# Patient Record
Sex: MALE | Race: BLACK OR AFRICAN AMERICAN | Employment: UNEMPLOYED | URBAN - METROPOLITAN AREA
[De-identification: names, ages, dates, MRNs, and addresses within clinical notes are randomized per-mention and may not be internally consistent; named-entity substitution may affect disease eponyms.]

---

## 2020-01-15 ENCOUNTER — HOSPITAL ENCOUNTER (EMERGENCY)
Age: 50
Discharge: HOME | DRG: 139 | End: 2020-01-15
Attending: EMERGENCY MEDICINE
Payer: MEDICAID

## 2020-01-15 ENCOUNTER — APPOINTMENT (OUTPATIENT)
Dept: GENERAL RADIOLOGY | Age: 50
DRG: 139 | End: 2020-01-15
Payer: MEDICAID

## 2020-01-15 ENCOUNTER — HOSPITAL ENCOUNTER (INPATIENT)
Age: 50
LOS: 1 days | Discharge: HOME | DRG: 139 | End: 2020-01-18
Attending: EMERGENCY MEDICINE | Admitting: FAMILY MEDICINE
Payer: MEDICAID

## 2020-01-15 VITALS
DIASTOLIC BLOOD PRESSURE: 87 MMHG | BODY MASS INDEX: 25.11 KG/M2 | WEIGHT: 175 LBS | RESPIRATION RATE: 16 BRPM | HEART RATE: 109 BPM | TEMPERATURE: 97.8 F | SYSTOLIC BLOOD PRESSURE: 120 MMHG | OXYGEN SATURATION: 97 %

## 2020-01-15 PROBLEM — J18.9 PNEUMONIA: Status: ACTIVE | Noted: 2020-01-15

## 2020-01-15 LAB
ANION GAP SERPL CALCULATED.3IONS-SCNC: 15 MMOL/L (ref 3–16)
BASE EXCESS VENOUS: -0.7 MMOL/L (ref -2–3)
BASOPHILS ABSOLUTE: 0 K/UL (ref 0–0.2)
BASOPHILS RELATIVE PERCENT: 0.2 %
BUN BLDV-MCNC: 10 MG/DL (ref 7–20)
CALCIUM SERPL-MCNC: 9.2 MG/DL (ref 8.3–10.6)
CARBOXYHEMOGLOBIN: 2.4 % (ref 0–1.5)
CHLORIDE BLD-SCNC: 99 MMOL/L (ref 99–110)
CO2: 22 MMOL/L (ref 21–32)
CREAT SERPL-MCNC: 0.7 MG/DL (ref 0.9–1.3)
EOSINOPHILS ABSOLUTE: 0 K/UL (ref 0–0.6)
EOSINOPHILS RELATIVE PERCENT: 0.2 %
GFR AFRICAN AMERICAN: >60
GFR NON-AFRICAN AMERICAN: >60
GLUCOSE BLD-MCNC: 129 MG/DL (ref 70–99)
HCO3 VENOUS: 22.2 MMOL/L (ref 24–28)
HCT VFR BLD CALC: 40.1 % (ref 40.5–52.5)
HEMOGLOBIN, VEN, REDUCED: 9.4 %
HEMOGLOBIN: 12.9 G/DL (ref 13.5–17.5)
LACTIC ACID, SEPSIS: 1.6 MMOL/L (ref 0.4–1.9)
LACTIC ACID, SEPSIS: 2.4 MMOL/L (ref 0.4–1.9)
LYMPHOCYTES ABSOLUTE: 0.5 K/UL (ref 1–5.1)
LYMPHOCYTES RELATIVE PERCENT: 3.5 %
MCH RBC QN AUTO: 32.5 PG (ref 26–34)
MCHC RBC AUTO-ENTMCNC: 32.2 G/DL (ref 31–36)
MCV RBC AUTO: 101 FL (ref 80–100)
METHEMOGLOBIN VENOUS: 0.3 % (ref 0–1.5)
MONOCYTES ABSOLUTE: 0.4 K/UL (ref 0–1.3)
MONOCYTES RELATIVE PERCENT: 2.9 %
NEUTROPHILS ABSOLUTE: 12.1 K/UL (ref 1.7–7.7)
NEUTROPHILS RELATIVE PERCENT: 93.2 %
O2 SAT, VEN: 90 %
PCO2, VEN: 32.5 MMHG (ref 41–51)
PDW BLD-RTO: 13.6 % (ref 12.4–15.4)
PH VENOUS: 7.45 (ref 7.35–7.45)
PLATELET # BLD: 582 K/UL (ref 135–450)
PMV BLD AUTO: 7.3 FL (ref 5–10.5)
PO2, VEN: 59.7 MMHG (ref 25–40)
POTASSIUM REFLEX MAGNESIUM: 4 MMOL/L (ref 3.5–5.1)
RAPID INFLUENZA  B AGN: NEGATIVE
RAPID INFLUENZA A AGN: NEGATIVE
RBC # BLD: 3.97 M/UL (ref 4.2–5.9)
SODIUM BLD-SCNC: 136 MMOL/L (ref 136–145)
TCO2 CALC VENOUS: 23 MMOL/L
WBC # BLD: 13 K/UL (ref 4–11)

## 2020-01-15 PROCEDURE — 2580000003 HC RX 258: Performed by: INTERNAL MEDICINE

## 2020-01-15 PROCEDURE — 87804 INFLUENZA ASSAY W/OPTIC: CPT

## 2020-01-15 PROCEDURE — 99285 EMERGENCY DEPT VISIT HI MDM: CPT

## 2020-01-15 PROCEDURE — 36415 COLL VENOUS BLD VENIPUNCTURE: CPT

## 2020-01-15 PROCEDURE — 71046 X-RAY EXAM CHEST 2 VIEWS: CPT

## 2020-01-15 PROCEDURE — 85025 COMPLETE CBC W/AUTO DIFF WBC: CPT

## 2020-01-15 PROCEDURE — 6360000002 HC RX W HCPCS: Performed by: EMERGENCY MEDICINE

## 2020-01-15 PROCEDURE — 96365 THER/PROPH/DIAG IV INF INIT: CPT

## 2020-01-15 PROCEDURE — 83605 ASSAY OF LACTIC ACID: CPT

## 2020-01-15 PROCEDURE — 82803 BLOOD GASES ANY COMBINATION: CPT

## 2020-01-15 PROCEDURE — 94640 AIRWAY INHALATION TREATMENT: CPT

## 2020-01-15 PROCEDURE — G0378 HOSPITAL OBSERVATION PER HR: HCPCS

## 2020-01-15 PROCEDURE — 6370000000 HC RX 637 (ALT 250 FOR IP): Performed by: EMERGENCY MEDICINE

## 2020-01-15 PROCEDURE — 94761 N-INVAS EAR/PLS OXIMETRY MLT: CPT

## 2020-01-15 PROCEDURE — 87040 BLOOD CULTURE FOR BACTERIA: CPT

## 2020-01-15 PROCEDURE — 80048 BASIC METABOLIC PNL TOTAL CA: CPT

## 2020-01-15 PROCEDURE — 2580000003 HC RX 258: Performed by: EMERGENCY MEDICINE

## 2020-01-15 PROCEDURE — 96367 TX/PROPH/DG ADDL SEQ IV INF: CPT

## 2020-01-15 RX ORDER — ALBUTEROL SULFATE 2.5 MG/3ML
2.5 SOLUTION RESPIRATORY (INHALATION) EVERY 4 HOURS PRN
Status: DISCONTINUED | OUTPATIENT
Start: 2020-01-15 | End: 2020-01-15

## 2020-01-15 RX ORDER — PREDNISONE 20 MG/1
40 TABLET ORAL ONCE
Status: COMPLETED | OUTPATIENT
Start: 2020-01-15 | End: 2020-01-15

## 2020-01-15 RX ORDER — PANTOPRAZOLE SODIUM 40 MG/1
40 TABLET, DELAYED RELEASE ORAL
Status: DISCONTINUED | OUTPATIENT
Start: 2020-01-16 | End: 2020-01-18 | Stop reason: HOSPADM

## 2020-01-15 RX ORDER — ALBUTEROL SULFATE 2.5 MG/3ML
2.5 SOLUTION RESPIRATORY (INHALATION) ONCE
Status: COMPLETED | OUTPATIENT
Start: 2020-01-15 | End: 2020-01-15

## 2020-01-15 RX ORDER — 0.9 % SODIUM CHLORIDE 0.9 %
30 INTRAVENOUS SOLUTION INTRAVENOUS ONCE
Status: DISCONTINUED | OUTPATIENT
Start: 2020-01-15 | End: 2020-01-15 | Stop reason: HOSPADM

## 2020-01-15 RX ORDER — SODIUM CHLORIDE 0.9 % (FLUSH) 0.9 %
10 SYRINGE (ML) INJECTION EVERY 12 HOURS SCHEDULED
Status: DISCONTINUED | OUTPATIENT
Start: 2020-01-15 | End: 2020-01-18 | Stop reason: HOSPADM

## 2020-01-15 RX ORDER — IPRATROPIUM BROMIDE AND ALBUTEROL SULFATE 2.5; .5 MG/3ML; MG/3ML
1 SOLUTION RESPIRATORY (INHALATION) ONCE
Status: COMPLETED | OUTPATIENT
Start: 2020-01-15 | End: 2020-01-15

## 2020-01-15 RX ORDER — GUAIFENESIN/DEXTROMETHORPHAN 100-10MG/5
5 SYRUP ORAL EVERY 4 HOURS PRN
Status: DISCONTINUED | OUTPATIENT
Start: 2020-01-15 | End: 2020-01-18 | Stop reason: HOSPADM

## 2020-01-15 RX ORDER — SODIUM CHLORIDE 0.9 % (FLUSH) 0.9 %
10 SYRINGE (ML) INJECTION PRN
Status: DISCONTINUED | OUTPATIENT
Start: 2020-01-15 | End: 2020-01-18 | Stop reason: HOSPADM

## 2020-01-15 RX ORDER — ONDANSETRON 2 MG/ML
4 INJECTION INTRAMUSCULAR; INTRAVENOUS EVERY 4 HOURS PRN
Status: DISCONTINUED | OUTPATIENT
Start: 2020-01-15 | End: 2020-01-18 | Stop reason: HOSPADM

## 2020-01-15 RX ORDER — ACETAMINOPHEN 325 MG/1
650 TABLET ORAL EVERY 4 HOURS PRN
Status: DISCONTINUED | OUTPATIENT
Start: 2020-01-15 | End: 2020-01-18 | Stop reason: HOSPADM

## 2020-01-15 RX ORDER — OMEPRAZOLE 40 MG/1
40 CAPSULE, DELAYED RELEASE ORAL DAILY
COMMUNITY

## 2020-01-15 RX ORDER — ALPRAZOLAM 0.5 MG/1
0.5 TABLET ORAL 2 TIMES DAILY PRN
COMMUNITY

## 2020-01-15 RX ORDER — SODIUM CHLORIDE 9 MG/ML
1000 INJECTION, SOLUTION INTRAVENOUS CONTINUOUS
Status: DISCONTINUED | OUTPATIENT
Start: 2020-01-15 | End: 2020-01-16

## 2020-01-15 RX ORDER — ALBUTEROL SULFATE 2.5 MG/3ML
2.5 SOLUTION RESPIRATORY (INHALATION) 3 TIMES DAILY
Status: DISCONTINUED | OUTPATIENT
Start: 2020-01-16 | End: 2020-01-18 | Stop reason: HOSPADM

## 2020-01-15 RX ADMIN — Medication 10 ML: at 22:51

## 2020-01-15 RX ADMIN — AZITHROMYCIN MONOHYDRATE 500 MG: 500 INJECTION, POWDER, LYOPHILIZED, FOR SOLUTION INTRAVENOUS at 21:28

## 2020-01-15 RX ADMIN — CEFTRIAXONE 1 G: 1 INJECTION, POWDER, FOR SOLUTION INTRAMUSCULAR; INTRAVENOUS at 20:56

## 2020-01-15 RX ADMIN — SODIUM CHLORIDE 1000 ML: 9 INJECTION, SOLUTION INTRAVENOUS at 22:50

## 2020-01-15 RX ADMIN — ALBUTEROL SULFATE 2.5 MG: 2.5 SOLUTION RESPIRATORY (INHALATION) at 16:50

## 2020-01-15 RX ADMIN — PREDNISONE 40 MG: 20 TABLET ORAL at 16:42

## 2020-01-15 RX ADMIN — IPRATROPIUM BROMIDE AND ALBUTEROL SULFATE 1 AMPULE: .5; 3 SOLUTION RESPIRATORY (INHALATION) at 21:09

## 2020-01-15 RX ADMIN — SODIUM CHLORIDE 1000 ML: 9 INJECTION, SOLUTION INTRAVENOUS at 20:56

## 2020-01-15 ASSESSMENT — PAIN DESCRIPTION - LOCATION: LOCATION: RIB CAGE

## 2020-01-15 ASSESSMENT — PAIN DESCRIPTION - PAIN TYPE: TYPE: ACUTE PAIN

## 2020-01-15 ASSESSMENT — PAIN SCALES - GENERAL
PAINLEVEL_OUTOF10: 0
PAINLEVEL_OUTOF10: 7
PAINLEVEL_OUTOF10: 8

## 2020-01-15 NOTE — ED NOTES
Patient wishes to leave against medical advise. Physician aware. Patient informed that they are leaving against the advice of the attending physician. Informed of risks by physician. No signs and symptoms distress noted or voiced. Patient   Agreeable to sign the AMA form and copy maintained in medical records. Patient states that he will go strait to [facility] ED after he takes his mother home.       Bubba Glasgow RN  01/15/20 8744

## 2020-01-15 NOTE — ED NOTES
Spacer given and education given. Pt received well.      [address] [place], [place]  01/15/20 1739

## 2020-01-15 NOTE — ED NOTES
Patient was to be admitted to [facility]. however patient came out to desk and states that he would like to just admit himself to [place] because he has to take his mother home. Patient is aware that he needs to sign out AMA and is ok to do so. MD aware. Patient states that he will take himself to [facility] ED right after he takes his mother home. He states that he needs to get better. Patient signed AMA form and left the ED.       Nereyda Palomino RN  01/15/20 8628

## 2020-01-15 NOTE — ED PROVIDER NOTES
CHIEF COMPLAINT  Cough      HISTORY OF PRESENT ILLNESS  Yvrose Cisse is a 51 y.o. male, who presents to the ED with a 2-week history of cough productive of brownish sputum associated with moderate to severe wheezing and dyspnea as well as chest pain with cough, tactile fever, sweats, fatigue, weakness, sore throat, headache, diarrhea. No earache no nausea no vomiting   Review of Systems    I have reviewed the following from the nursing documentation. Past Medical History:   Diagnosis Date    Anxiety     Asthma     Bipolar affective disorder ([address] 6/4/2010    Depression 6/4/2010    GERD (gastroesophageal reflux disease) 4/28/2010    Hypertension     Impotence 3/31/2010    Insomnia      History reviewed. No pertinent surgical history. History reviewed. No pertinent family history.   Social History     Socioeconomic History    Marital status: Single     Spouse name: Not on file    Number of children: Not on file    Years of education: Not on file    Highest education level: Not on file   Occupational History    Not on file   Social Needs    Financial resource strain: Not on file    Food insecurity:     Worry: Not on file     Inability: Not on file    Transportation needs:     Medical: Not on file     Non-medical: Not on file   Tobacco Use    Smoking status: Current Some Day Smoker    Smokeless tobacco: Never Used   Substance and Sexual Activity    Alcohol use: Yes     Comment: socially    Drug use: No    Sexual activity: Not on file   Lifestyle    Physical activity:     Days per week: Not on file     Minutes per session: Not on file    Stress: Not on file   Relationships    Social connections:     Talks on phone: Not on file     Gets together: Not on file     Attends [employer] service: Not on file     Active member of club or organization: Not on file     Attends meetings of clubs or organizations: Not on file     Relationship status: Not on file    Intimate partner violence:     Fear of current or ex partner: Not on file     Emotionally abused: Not on file     Physically abused: Not on file     Forced sexual activity: Not on file   Other Topics Concern    Not on file   Social History Narrative    Not on file     No current facility-administered medications for this encounter. Current Outpatient Medications   Medication Sig Dispense Refill    omeprazole (PRILOSEC) 40 MG delayed release capsule Take 40 mg by mouth daily       ALPRAZolam (XANAX) 0.5 MG tablet Take 0.5 mg by mouth 3 times daily as needed.  amLODIPine (NORVASC) 5 MG tablet Take 5 mg by mouth daily      albuterol (VENTOLIN HFA) 108 (90 BASE) MCG/ACT inhaler Inhale 2 puffs into the lungs every 6 hours as needed for Wheezing. 1 Inhaler 6    sertraline (ZOLOFT) 50 MG tablet Take 50 mg by mouth nightly as needed       Facility-Administered Medications Ordered in Other Encounters   Medication Dose Route Frequency Provider Last Rate Last Dose    ipratropium-albuterol (DUONEB) nebulizer solution 1 ampule  1 ampule Inhalation Once Dario Chicas MD        azithromycin (ZITHROMAX) 500 mg in dextrose 5 % 250 mL IVPB  500 mg Intravenous Once Dario Chicas MD        cefTRIAXone (ROCEPHIN) 1 g IVPB in 50 mL D5W minibag  1 g Intravenous Once Dario Chicas  mL/hr at 01/15/20 2056 1 g at 01/15/20 2056    0.9 % sodium chloride infusion  1,000 mL Intravenous Continuous Dario Chicas  mL/hr at 01/15/20 2056 1,000 mL at 01/15/20 2056     Allergies   Allergen Reactions    Vicodin [Hydrocodone-Acetaminophen] Hives          PHYSICAL EXAM  /87   Pulse 109   Temp 97.8 °F (36.6 °C) (Oral)   Resp 16   Wt 79.4 kg (175 lb)   SpO2 97%   BMI 25.11 kg/m²   Physical Exam   GENERAL APPEARANCE: Awake and alert. Cooperative. In no acute distress. EYES: PERRL. Corneas clear. Sclera non icteric. No conjunctival injection  ENT: Oropharynx clear. airway patent. No stridor. No asymmetry.

## 2020-01-16 ENCOUNTER — APPOINTMENT (OUTPATIENT)
Dept: CT IMAGING | Age: 50
DRG: 139 | End: 2020-01-16
Payer: MEDICAID

## 2020-01-16 LAB
ANION GAP SERPL CALCULATED.3IONS-SCNC: 13 MMOL/L (ref 3–16)
BASOPHILS ABSOLUTE: 0 K/UL (ref 0–0.2)
BASOPHILS RELATIVE PERCENT: 0.4 %
BUN BLDV-MCNC: 10 MG/DL (ref 7–20)
CALCIUM SERPL-MCNC: 8.5 MG/DL (ref 8.3–10.6)
CHLORIDE BLD-SCNC: 105 MMOL/L (ref 99–110)
CO2: 20 MMOL/L (ref 21–32)
CREAT SERPL-MCNC: 0.7 MG/DL (ref 0.9–1.3)
EOSINOPHILS ABSOLUTE: 0 K/UL (ref 0–0.6)
EOSINOPHILS RELATIVE PERCENT: 0.5 %
GFR AFRICAN AMERICAN: >60
GFR NON-AFRICAN AMERICAN: >60
GLUCOSE BLD-MCNC: 118 MG/DL (ref 70–99)
HCT VFR BLD CALC: 33.8 % (ref 40.5–52.5)
HEMOGLOBIN: 11 G/DL (ref 13.5–17.5)
LYMPHOCYTES ABSOLUTE: 1.2 K/UL (ref 1–5.1)
LYMPHOCYTES RELATIVE PERCENT: 13.4 %
MCH RBC QN AUTO: 32.1 PG (ref 26–34)
MCHC RBC AUTO-ENTMCNC: 32.4 G/DL (ref 31–36)
MCV RBC AUTO: 99 FL (ref 80–100)
MONOCYTES ABSOLUTE: 1 K/UL (ref 0–1.3)
MONOCYTES RELATIVE PERCENT: 11.3 %
NEUTROPHILS ABSOLUTE: 6.5 K/UL (ref 1.7–7.7)
NEUTROPHILS RELATIVE PERCENT: 74.4 %
PDW BLD-RTO: 12.7 % (ref 12.4–15.4)
PLATELET # BLD: 523 K/UL (ref 135–450)
PMV BLD AUTO: 7.1 FL (ref 5–10.5)
POTASSIUM REFLEX MAGNESIUM: 4.1 MMOL/L (ref 3.5–5.1)
RBC # BLD: 3.41 M/UL (ref 4.2–5.9)
SODIUM BLD-SCNC: 138 MMOL/L (ref 136–145)
WBC # BLD: 8.7 K/UL (ref 4–11)

## 2020-01-16 PROCEDURE — 2580000003 HC RX 258: Performed by: INTERNAL MEDICINE

## 2020-01-16 PROCEDURE — 96367 TX/PROPH/DG ADDL SEQ IV INF: CPT

## 2020-01-16 PROCEDURE — 94640 AIRWAY INHALATION TREATMENT: CPT

## 2020-01-16 PROCEDURE — 85025 COMPLETE CBC W/AUTO DIFF WBC: CPT

## 2020-01-16 PROCEDURE — 6360000002 HC RX W HCPCS: Performed by: INTERNAL MEDICINE

## 2020-01-16 PROCEDURE — 71260 CT THORAX DX C+: CPT

## 2020-01-16 PROCEDURE — G0378 HOSPITAL OBSERVATION PER HR: HCPCS

## 2020-01-16 PROCEDURE — 94761 N-INVAS EAR/PLS OXIMETRY MLT: CPT

## 2020-01-16 PROCEDURE — 36415 COLL VENOUS BLD VENIPUNCTURE: CPT

## 2020-01-16 PROCEDURE — 80048 BASIC METABOLIC PNL TOTAL CA: CPT

## 2020-01-16 PROCEDURE — 96366 THER/PROPH/DIAG IV INF ADDON: CPT

## 2020-01-16 PROCEDURE — 6360000004 HC RX CONTRAST MEDICATION: Performed by: FAMILY MEDICINE

## 2020-01-16 PROCEDURE — 6370000000 HC RX 637 (ALT 250 FOR IP): Performed by: INTERNAL MEDICINE

## 2020-01-16 PROCEDURE — 6370000000 HC RX 637 (ALT 250 FOR IP): Performed by: FAMILY MEDICINE

## 2020-01-16 RX ORDER — AMLODIPINE BESYLATE 5 MG/1
5 TABLET ORAL DAILY
Status: DISCONTINUED | OUTPATIENT
Start: 2020-01-16 | End: 2020-01-18 | Stop reason: HOSPADM

## 2020-01-16 RX ORDER — ALPRAZOLAM 0.5 MG/1
0.5 TABLET ORAL 2 TIMES DAILY PRN
Status: DISCONTINUED | OUTPATIENT
Start: 2020-01-16 | End: 2020-01-18 | Stop reason: HOSPADM

## 2020-01-16 RX ORDER — ALBUTEROL SULFATE 2.5 MG/3ML
2.5 SOLUTION RESPIRATORY (INHALATION) EVERY 4 HOURS PRN
Status: DISCONTINUED | OUTPATIENT
Start: 2020-01-16 | End: 2020-01-18 | Stop reason: HOSPADM

## 2020-01-16 RX ORDER — PREDNISONE 20 MG/1
40 TABLET ORAL DAILY
Status: DISCONTINUED | OUTPATIENT
Start: 2020-01-16 | End: 2020-01-18 | Stop reason: HOSPADM

## 2020-01-16 RX ADMIN — AZITHROMYCIN MONOHYDRATE 500 MG: 500 INJECTION, POWDER, LYOPHILIZED, FOR SOLUTION INTRAVENOUS at 21:37

## 2020-01-16 RX ADMIN — Medication 10 ML: at 20:57

## 2020-01-16 RX ADMIN — ALBUTEROL SULFATE 2.5 MG: 2.5 SOLUTION RESPIRATORY (INHALATION) at 08:11

## 2020-01-16 RX ADMIN — IOPAMIDOL 80 ML: 755 INJECTION, SOLUTION INTRAVENOUS at 14:34

## 2020-01-16 RX ADMIN — PREDNISONE 40 MG: 20 TABLET ORAL at 12:59

## 2020-01-16 RX ADMIN — ALPRAZOLAM 0.5 MG: 0.5 TABLET ORAL at 12:59

## 2020-01-16 RX ADMIN — SODIUM CHLORIDE 1000 ML: 9 INJECTION, SOLUTION INTRAVENOUS at 06:18

## 2020-01-16 RX ADMIN — GUAIFENESIN AND DEXTROMETHORPHAN 5 ML: 100; 10 SYRUP ORAL at 06:14

## 2020-01-16 RX ADMIN — GUAIFENESIN AND DEXTROMETHORPHAN 5 ML: 100; 10 SYRUP ORAL at 11:55

## 2020-01-16 RX ADMIN — PANTOPRAZOLE SODIUM 40 MG: 40 TABLET, DELAYED RELEASE ORAL at 06:16

## 2020-01-16 RX ADMIN — CEFTRIAXONE 1 G: 1 INJECTION, POWDER, FOR SOLUTION INTRAMUSCULAR; INTRAVENOUS at 20:54

## 2020-01-16 RX ADMIN — AMLODIPINE BESYLATE 5 MG: 5 TABLET ORAL at 12:59

## 2020-01-16 RX ADMIN — Medication 10 ML: at 08:58

## 2020-01-16 RX ADMIN — ALBUTEROL SULFATE 2.5 MG: 2.5 SOLUTION RESPIRATORY (INHALATION) at 21:00

## 2020-01-16 RX ADMIN — ALBUTEROL SULFATE 2.5 MG: 2.5 SOLUTION RESPIRATORY (INHALATION) at 14:08

## 2020-01-16 RX ADMIN — ALPRAZOLAM 0.5 MG: 0.5 TABLET ORAL at 20:58

## 2020-01-16 ASSESSMENT — PAIN SCALES - GENERAL
PAINLEVEL_OUTOF10: 0
PAINLEVEL_OUTOF10: 7
PAINLEVEL_OUTOF10: 0
PAINLEVEL_OUTOF10: 0

## 2020-01-16 ASSESSMENT — PAIN DESCRIPTION - LOCATION: LOCATION: OTHER (COMMENT)

## 2020-01-16 ASSESSMENT — PAIN DESCRIPTION - ORIENTATION: ORIENTATION: RIGHT;LEFT

## 2020-01-16 ASSESSMENT — ENCOUNTER SYMPTOMS
WHEEZING: 1
SHORTNESS OF BREATH: 1
NAUSEA: 0
ABDOMINAL PAIN: 0
COUGH: 1
VOMITING: 0

## 2020-01-16 ASSESSMENT — PAIN DESCRIPTION - DESCRIPTORS: DESCRIPTORS: SORE

## 2020-01-16 ASSESSMENT — PAIN DESCRIPTION - ONSET: ONSET: ON-GOING

## 2020-01-16 ASSESSMENT — PAIN DESCRIPTION - PAIN TYPE: TYPE: ACUTE PAIN

## 2020-01-16 ASSESSMENT — PAIN DESCRIPTION - FREQUENCY: FREQUENCY: CONTINUOUS

## 2020-01-16 NOTE — H&P
Hospital Medicine History & Physical      PCP: Shazia Malagon MD    Date of Admission: 1/15/2020    Date of Service: Pt seen/examined on 1.16.2020/Placed in Observation. Chief Complaint:  Shortness of breath      History Of Present Illness: This is a 51 y.o. male with PmHx Asthma, Anxiety and Bipolar who presented to [facility]. c/o shortness of breath. He had visited [address] [place] [address] ER earlier in the day and CXR showed a multifocal pneumonia. He also reports that he had been to 2 urgent care centers since the previous week. He states he completed a course of prednisone and azithromycin, but did not feel better. He denies any productive cough. He denies chest pain. States he only smokes \"socially. \" He has been tachycardic. He is not requiring oxygen. Past Medical History:          Diagnosis Date    Anxiety     Asthma     Bipolar affective disorder ([facility] 6/4/2010    Depression 6/4/2010    GERD (gastroesophageal reflux disease) 4/28/2010    Hypertension     Impotence 3/31/2010    Insomnia        Past Surgical History:      History reviewed. No pertinent surgical history. Medications Prior to Admission:      Prior to Admission medications    Medication Sig Start Date End Date Taking? Authorizing Provider   omeprazole (PRILOSEC) 40 MG delayed release capsule Take 40 mg by mouth daily    Yes Historical Provider, MD   ALPRAZolam (XANAX) 0.5 MG tablet Take 0.5 mg by mouth 3 times daily as needed. Yes Historical Provider, MD   sertraline (ZOLOFT) 50 MG tablet Take 50 mg by mouth nightly as needed   Yes Historical Provider, MD   amLODIPine (NORVASC) 5 MG tablet Take 5 mg by mouth daily   Yes Historical Provider, MD   albuterol (VENTOLIN HFA) 108 (90 BASE) MCG/ACT inhaler Inhale 2 puffs into the lungs every 6 hours as needed for Wheezing. 1/29/13  Yes Pratik Knapp MD       Allergies:  Vicodin [hydrocodone-acetaminophen]    Social History:      The patient currently lives at home. TOBACCO:   reports that he has been smoking. He has never used smokeless tobacco.  ETOH:   reports current alcohol use. E-Cigarettes Vaping or Juuling     Questions Responses    E-Cigarette Use     Start Date     Does device contain nicotine? Quit Date     E-Cigarette Type           Family History:      Reviewed in detail and negative for DM, CAD, Cancer, CVA. Positive as follows:    History reviewed. No pertinent family history. REVIEW OF SYSTEMS:   Pertinent positives as noted in the HPI. All other systems reviewed and negative. PHYSICAL EXAM PERFORMED:    /88   Pulse 113   Temp 97.9 °F (36.6 °C) (Oral)   Resp 18   Ht 5' 10\" (1.778 m)   Wt 173 lb 8 oz (78.7 kg)   SpO2 93%   BMI 24.89 kg/m²     General appearance:  No apparent distress, appears stated age and cooperative. HEENT:  Normal cephalic, atraumatic without obvious deformity. Pupils equal, round, and reactive to light. Extra ocular muscles intact. Conjunctivae/corneas clear. Neck: Supple, with full range of motion. No jugular venous distention. Trachea midline. Respiratory:  Normal respiratory effort. Clear to auscultation, bilaterally without Rales/Wheezes/Rhonchi. Cardiovascular: tachycardic   Abdomen: Soft, non-tender, non-distended with normal bowel sounds. Musculoskeletal:  No clubbing, cyanosis or edema bilaterally. Full range of motion without deformity. Skin: Skin color, texture, turgor normal.  No rashes or lesions. Labs:     Recent Labs     01/15/20  2039 01/16/20  0507   WBC 13.0* 8.7   HGB 12.9* 11.0*   HCT 40.1* 33.8*   * 523*     Recent Labs     01/15/20  2039 01/16/20  0507    138   K 4.0 4.1   CL 99 105   CO2 22 20*   BUN 10 10   CREATININE 0.7* 0.7*   CALCIUM 9.2 8.5     No results for input(s): AST, ALT, BILIDIR, BILITOT, ALKPHOS in the last 72 hours. No results for input(s): INR in the last 72 hours. No results for input(s): Olive Kalata in the last 72 hours.     Urinalysis:    No results found for: Soniya Graham, BACTERIA, RBCUA, BLOODU, [address], [address]    Radiology:     CT CHEST PULMONARY EMBOLISM W CONTRAST    (Results Pending)       ASSESSMENT:    Active Hospital Problems    Diagnosis Date Noted    Pneumonia [J18.9] 01/15/2020       PLAN:    1. Multifocal pneumonia:  -cont. Rocephin and Azithromycin for now. Added prednisone.   -pending CT chest.   -duonebs. -monitor.  -not requiring O2. 2.Tachycardia:  -not requiring O2, but appears to be persistent.   -possibly due to albuterol, but will order CT Chest to r/o PE. 3.Asthma: chronic, possibly mild exacerbation.  -duonebs, added prednisone.  -monitor. 4.Anxiety: resume home Xanax prn.    5. Bipolar disorder: needs to be monitored as outpatient psych. DVT Prophylaxis: lovenox  Diet: DIET GENERAL;  Code Status: Full Code    PT/OT Eval Status: not needed    Dispo - possible discharge tomorrow if improved/stable, pending results of CT Chest.       Shameka Mcdaniels MD    Thank you Dc Landeros MD for the opportunity to be involved in this patient's care. If you have any questions or concerns please feel free to contact me at [phone].

## 2020-01-16 NOTE — CARE COORDINATION
Case Management Assessment           Initial Evaluation                Date / Time of Evaluation: 1/16/2020 10:57 AM                 Assessment Completed by: Carlos Trevizo    Patient Name: Lobito Zamora     YOB: 1970  Diagnosis: Pneumonia [J18.9]  Pneumonia [J18.9]     Date / Time: 1/15/2020  8:01 PM    Patient Admission Status: Inpatient    If patient is discharged prior to next notation, then this note serves as note for discharge by case management. Current PCP: Shazia Malagon MD  Clinic Patient: No    Chart Reviewed: Yes  Patient/ Family Interviewed: Yes    Initial assessment completed at bedside with: patient    Hospitalization in the last 30 days: No    Emergency Contacts:  Extended Emergency Contact Information  Primary Emergency Contact: Malia Glass  Address: [address]           [place], [address] [facility]  Home Phone: [phone]  Relation: Parent  Secondary Emergency Contact: Malia Glass  Address: [address]           [place], [address] [facility]  Home Phone: [phone]  Relation: Parent    Advance Directives:   Code Status: [address]: No  Agent:   Contact Number:     Copy present: No     In paper Chart: No    Scanned into EMR No    Financial  Payor: Nikia Parmar / Plan: Breana Days / Product Type: *No Product type* /     Pre-cert required for SNF: Yes    Pharmacy    [facility] [address] [place] [address]. - P [phone] - [address] [place] [address]  Phone: [phone] Fax: [phone]      Potential assistance Purchasing Medications: Potential Assistance Purchasing Medications: No  Does Patient want to participate in local refill/ meds to beds program?: No    Meds To Beds General Rules:  1. Can ONLY be done Monday- Friday between 8:30am-5pm  2. Prescription(s) must be in pharmacy by 3pm to be filled same day  3. Copy of patient's insurance/ prescription drug card and patient face sheet must be sent along with the prescription(s)  4. Cost of Rx cannot be added to hospital bill. If financial assistance is needed, please contact unit  or ;  or  CANNOT provide pharmacy voucher for patients co-pays  5. Patients can then  the prescription on their way out of the hospital at discharge, or pharmacy can deliver to the bedside if staff is available. (payment due at time of pick-up or delivery - cash, check, or card accepted)     Able to afford home medications/ co-pay costs: Yes    ADLS  Support Systems: Children    PT AM-PAC:   /24  OT AM-PAC:   /24    [place]: home alone in apartment  Steps: 0     Plans to RETURN to current housing: Yes  Barriers to RETURNING to current housing: none    [address]  Currently ACTIVE with [address]: No  Home Care Agency: Not Applicable    Currently ACTIVE with [place] on Aging: No  Passport/ Waiver: No  Passport/ Waiver Services: Not Applicable    :  Phone:       [address]  [facility] Provider: none  Equipment:     Home Oxygen and [address] prior to admission: No  [address]: Not Applicable  Other Respiratory Equipment:     Informed of need to bring portable home O2 tank on day of DISCHARGE for nursing to connect prior to leaving: No  Verbalized agreement/Understanding: No  Person to bring portable tank at discharge:     Dialysis  Active with HD/PD prior to admission: No  Nephrologist:     HD Center:  Not Applicable    DISCHARGE PLAN:  Disposition: Home- No Services Needed    Transportation PLAN for discharge: possibly lyft     Factors facilitating achievement of predicted outcomes: Motivated and Cooperative    Barriers to discharge: No caregiver support    Additional Case Management Notes: CM met with patient. Pt has nebulizers at home. Does not anticipate any discharge needs besides a lyft for the ride.   Pt from home alone in an apartment. The Plan for Transition of Care is related to the following treatment goals of Pneumonia [J18.9]  Pneumonia [J18.9]    The Patient and/or patient representative [facility] and his family were provided with a choice of provider and agrees with the discharge plan Yes    Freedom of choice list was provided with basic dialogue that supports the patient's individualized plan of care/goals and shares the quality data associated with the providers.  Yes      Care Transition patient: No    Larry Loredo RN  [facility]  Case Management Department  Ph: [phone]   Fax: [phone]

## 2020-01-16 NOTE — PROGRESS NOTES
RESPIRATORY THERAPY ASSESSMENT    Name:  Jt Toledo Record Number:  [id number]  Age: 51 y.o. Gender: male  : 1970  Today's Date:  1/15/2020  Room:  [facility]-    Assessment     Is the patient being admitted for a COPD or Asthma exacerbation? No   (If yes the patient will be seen every 4 hours for the first 24 hours and then reassessed)    Patient Admission Diagnosis      Allergies  Allergies   Allergen Reactions    Vicodin [Hydrocodone-Acetaminophen] Hives       Minimum Predicted Vital Capacity:     1150          Actual Vital Capacity:      1500              Pulmonary History:COPD  Home Oxygen Therapy:  room air  Home Respiratory Therapy:hhn albuterol prn   Current Respiratory Therapy:  hhn albuterol tid  Treatment Type: IS  Medications: Albuterol/Ipratropium    Respiratory Severity Index(RSI)   Patients with orders for inhalation medications, oxygen, or any therapeutic treatment modality will be placed on Respiratory Protocol. They will be assessed with the first treatment and at least every 72 hours thereafter. The following severity scale will be used to determine frequency of treatment intervention. Smoking History: Pulmonary Disease or Smoking History, Greater than 15 pack year = 2    Social History  Social History     Tobacco Use    Smoking status: Current Some Day Smoker    Smokeless tobacco: Never Used    Tobacco comment: socially   Substance Use Topics    Alcohol use: Yes     Comment: socially    Drug use: No       Recent Surgical History: None = 0  Past Surgical History  History reviewed. No pertinent surgical history.     Level of Consciousness: Alert, Oriented, and Cooperative = 0    Level of Activity: Walking unassisted = 0    Respiratory Pattern: Regular Pattern; RR 8-20 = 0    Breath Sounds: Diminshed bilaterally and/or crackles = 2    Sputum   ,  ,    Cough: Strong, spontaneous, non-productive = 0    Vital Signs   /72   Pulse 123   Temp 98.5 °F (36.9 °C) (Oral)   Resp 20   SpO2 94%   SPO2 (COPD values may differ): Greater than or equal to 92% on room air = 0    Peak Flow (asthma only): not applicable = 0    RSI: 0-4 = See once and convert to home regimen or discontinue        Plan       Goals: medication delivery    Patient/caregiver was educated on the proper method of use for Respiratory Care Devices:  Yes      Level of patient/caregiver understanding able to:   ? Verbalize understanding   ? Demonstrate understanding       ? Teach back        ? Needs reinforcement       ? No available caregiver               ? Other:     Response to education:  Excellent     Is patient being placed on Home Treatment Regimen? No     Does the patient have everything they need prior to discharge? Yes     Comments: admits with pneumonia    Plan of Care: hhn albuterol tid    Electronically signed by Dulce Maria Patrick RCP on 1/15/2020 at 11:10 PM    Respiratory Protocol Guidelines     1. Assessment and treatment by Respiratory Therapy will be initiated for medication and therapeutic interventions upon initiation of aerosolized medication. 2. Physician will be contacted for respiratory rate (RR) greater than 35 breaths per minute. Therapy will be held for heart rate (HR) greater than 140 beats per minute, pending direction from physician. 3. Bronchodilators will be administered via Metered Dose Inhaler (MDI) with spacer when the following criteria are met:  a. Alert and cooperative     b. HR < 140 bpm  c. RR < 30 bpm                d. Can demonstrate a 2-3 second inspiratory hold  4. Bronchodilators will be administered via Hand Held Nebulizer [facility]) to patients when ANY of the following criteria are met  a. Incognizant or uncooperative          b. Patients treated with HHN at Home        c. Unable to demonstrate proper use of MDI with spacer     d. RR > 30 bpm   5.  Bronchodilators will be delivered via Metered Dose Inhaler (MDI), HHN, Aerogen to intubated patients on mechanical ventilation. 6. Inhalation medication orders will be delivered and/or substituted as outlined below. Aerosolized Medications Ordering and Administration Guidelines:    1. All Medications will be ordered by a physician, and their frequency and/or modality will be adjusted as defined by the patients Respiratory Severity Index (RSI) score. 2. If the patient does not have documented COPD, consider discontinuing anticholinergics when RSI is less than 9.  3. If the bronchospasm worsens (increased RSI), then the bronchodilator frequency can be increased to a maximum of every 4 hours. If greater than every 4 hours is required, the physician will be contacted. 4. If the bronchospasm improves, the frequency of the bronchodilator can be decreased, based on the patient's RSI, but not less than home treatment regimen frequency. 5. Bronchodilator(s) will be discontinued if patient has a RSI less than 9 and has received no scheduled or as needed treatment for 72  Hrs. Patients Ordered on a Mucolytic Agent:    1. Must always be administered with a bronchodilator. 2. Discontinue if patient experiences worsened bronchospasm, or secretions have lessened to the point that the patient is able to clear them with a cough. Anti-inflammatory and Combination Medications:    1. If the patient lacks prior history of lung disease, is not using inhaled anti-inflammatory medication at home, and lacks wheezing by examination or by history for at least 24 hours, contact physician for possible discontinuation.

## 2020-01-16 NOTE — ED PROVIDER NOTES
[address] [facility]          ATTENDING PHYSICIAN NOTE       Date of evaluation: 1/15/2020    Chief Complaint     Pneumonia (pt states he was at another [facility] location and was supposed to be admitted but had to take his mother home)      History of Present Illness     Jad Urbano is a 51 y.o. male who presents With a complaint of pneumonia. He has a history of asthma. He has been using his home inhaler at home without relief of symptoms. He went to [facility], and was found to have multifocal pneumonia. He was recommended to be admitted to the hospital but signed out against medical advice to take care of family matters and take his mother home. [facility]. He was given steroids prior to Discharge from [facility], but no antibiotics. He was given a single Duoneb but states he feels short of breath again. He denies weight gain, shortness of breath, or chest pain. He's had a cough productive sputum but cannot elaborate on the color  . Symptoms have been ongoing for several days. Review of Systems     Review of Systems   Constitutional: Positive for fatigue. Negative for fever. Respiratory: Positive for cough, shortness of breath and wheezing. Cardiovascular: Negative for chest pain and palpitations. Gastrointestinal: Negative for abdominal pain, nausea and vomiting. Past Medical, Surgical, Family, and Social History     He has a past medical history of Anxiety, Asthma, Bipolar affective disorder ([address] Depression, GERD (gastroesophageal reflux disease), Hypertension, Impotence, and Insomnia. He has no past surgical history on file. His family history is not on file. He reports that he has been smoking. He has never used smokeless tobacco. He reports current alcohol use. He reports that he does not use drugs.     Medications     Previous Medications    ALBUTEROL (VENTOLIN HFA) 108 (90 BASE) MCG/ACT INHALER Inhale 2 puffs into the lungs every 6 hours as needed for Wheezing. ALPRAZOLAM (XANAX) 0.5 MG TABLET    Take 0.5 mg by mouth 3 times daily as needed. AMLODIPINE (NORVASC) 5 MG TABLET    Take 5 mg by mouth daily    OMEPRAZOLE (PRILOSEC) 40 MG DELAYED RELEASE CAPSULE    Take 40 mg by mouth daily     SERTRALINE (ZOLOFT) 50 MG TABLET    Take 50 mg by mouth nightly as needed       Allergies     He is allergic to vicodin [hydrocodone-acetaminophen]. Physical Exam     INITIAL VITALS: BP: (!) 130/91, Temp: 98.3 °F (36.8 °C), Pulse: 113, Resp: 20, SpO2: 96 %   Physical Exam  Constitutional: Well nourished  male who appears in no acute distress. HEENT: NC/AT. PERRL 4-2 bilaterally. EOMI. CV:Heart is regular rate and rhythm without murmurs, rubs or gallops. Resp: He is tachypnic with mild inspiratory and expiratory wheezing. No accessory muscle use noted    Abd: Soft, nontender, nondistended. MSK: Full ROM, no edema or tenderness to palpation. Skin: Extremities are warm and well perfused. 2+ radial pulses . Cap Refill <3 seconds. Neuro: A&Ox3. Cranial nerves grossly intact   Strength and sensation intact x4 extremities. Psych:  Thought content, behavior & judgement normal.    Diagnostic Results         RADIOLOGY:  No orders to display       LABS:   Results for orders placed or performed during the hospital encounter of 01/15/20   CBC Auto Differential   Result Value Ref Range    WBC 13.0 (H) 4.0 - 11.0 K/uL    RBC 3.97 (L) 4.20 - 5.90 M/uL    Hemoglobin 12.9 (L) 13.5 - 17.5 g/dL    Hematocrit 40.1 (L) 40.5 - 52.5 %    .0 (H) 80.0 - 100.0 fL    MCH 32.5 26.0 - 34.0 pg    MCHC 32.2 31.0 - 36.0 g/dL    RDW 13.6 12.4 - 15.4 %    Platelets 810 (H) 524 - 450 K/uL    MPV 7.3 5.0 - 10.5 fL    Neutrophils % 93.2 %    Lymphocytes % 3.5 %    Monocytes % 2.9 %    Eosinophils % 0.2 %    Basophils % 0.2 %    Neutrophils Absolute 12.1 (H) 1.7 - 7.7 K/uL    Lymphocytes Absolute 0.5 (L) 1.0 - 5.1 K/uL    Monocytes Absolute 0.4 0.0 - 1.3 K/uL    Eosinophils Absolute 0.0 0.0 - 0.6 K/uL    Basophils Absolute 0.0 0.0 - 0.2 K/uL   Blood gas, venous (Lab)   Result Value Ref Range    pH, Shaquille 7.448 7.350 - 7.450    pCO2, Shaquille 32.5 (L) 41.0 - 51.0 mmHg    pO2, Shaquille 59.7 (H) 25.0 - 40.0 mmHg    HCO3, Venous 22.2 (L) 24.0 - 28.0 mmol/L    Base Excess, Shaquille -0.7 -2.0 - 3.0 mmol/L    O2 Sat, Shaquille 90 Not established %    Carboxyhemoglobin 2.4 (H) 0.0 - 1.5 %    MetHgb, Shaquille 0.3 0.0 - 1.5 %    TC02 (Calc), Shaquille 23 mmol/L    Hemoglobin, Shaquille, Reduced 9.40 %       ED BEDSIDE ULTRASOUND:      RECENT VITALS:  BP: (!) 130/91,Temp: 98.3 °F (36.8 °C), Pulse: 113, Resp: 20, SpO2: 96 %     Procedures         ED Course     Nursing Notes, Past Medical Hx, Past Surgical Hx, Social Hx,Allergies, and Family Hx were reviewed. patient was given the following medications:  Orders Placed This Encounter   Medications    ipratropium-albuterol (DUONEB) nebulizer solution 1 ampule    azithromycin (ZITHROMAX) 500 mg in dextrose 5 % 250 mL IVPB    cefTRIAXone (ROCEPHIN) 1 g IVPB in 50 mL D5W minibag    0.9 % sodium chloride infusion       CONSULTS:  IP CONSULT TO HOSPITALIST    MEDICAL DECISIONMAKING / ASSESSMENT / Asa Kayla is a 51 y.o. male presenting with a compliant of multifocal pneumonia. He is tachypnic after receiving a neb at [facility], reordered here on arrival.  Labs show mild leukocytosis, negative rapid flu swab, and he was started on empiric rocephin/azithro for CAP coverage. Low suspicion for ACS, PE or acute decompensated heart failure. Will admit to hospitalist for IV antibiotics and frequent nebulizer treatments. VBG with mild alkalosis but no respiratory acidosis. Clinical Impression     1. Pneumonia due to organism        Disposition     PATIENT REFERRED TO:  No follow-up provider specified.     DISCHARGE MEDICATIONS:  New Prescriptions    No medications on file       DISPOSITION Decision To Admit 01/15/2020 09:05:21 PM          Jeanette Ramey MD  01/16/20 0004

## 2020-01-16 NOTE — PROGRESS NOTES
RESPIRATORY THERAPY ASSESSMENT    Name:  Jt Toledo Record Number:  [id number]  Age: 51 y.o. Gender: male  : 1970  Today's Date:  2020  Room:  [facility]-    Assessment     Is the patient being admitted for a COPD or Asthma exacerbation? No   (If yes the patient will be seen every 4 hours for the first 24 hours and then reassessed)    Patient Admission Diagnosis      Allergies  Allergies   Allergen Reactions    Vicodin [Hydrocodone-Acetaminophen] Hives       Minimum Predicted Vital Capacity:     1150          Actual Vital Capacity:      1500              Pulmonary History:COPD  Home Oxygen Therapy:  room air  Home Respiratory Therapy:hhn albuterol prn   Current Respiratory Therapy:  hhn albuterol tid  Treatment Type: HHN  Medications: Albuterol    Respiratory Severity Index(RSI)   Patients with orders for inhalation medications, oxygen, or any therapeutic treatment modality will be placed on Respiratory Protocol. They will be assessed with the first treatment and at least every 72 hours thereafter. The following severity scale will be used to determine frequency of treatment intervention. Smoking History: Pulmonary Disease or Smoking History, Greater than 15 pack year = 2    Social History  Social History     Tobacco Use    Smoking status: Current Some Day Smoker    Smokeless tobacco: Never Used    Tobacco comment: socially   Substance Use Topics    Alcohol use: Yes     Comment: socially    Drug use: No       Recent Surgical History: None = 0  Past Surgical History  History reviewed. No pertinent surgical history.     Level of Consciousness: Alert, Oriented, and Cooperative = 0    Level of Activity: Walking unassisted = 0    Respiratory Pattern: Regular Pattern; RR 8-20 = 0    Breath Sounds: Diminshed bilaterally and/or crackles = 2    Sputum   ,  , Sputum How Obtained: Spontaneous cough  Cough: Strong, spontaneous, non-productive = 0    Vital Signs   /84   Pulse 114 Temp 97.9 °F (36.6 °C) (Oral)   Resp 18   Ht 5' 10\" (1.778 m)   Wt 173 lb 8 oz (78.7 kg)   SpO2 95%   BMI 24.89 kg/m²   SPO2 (COPD values may differ): Greater than or equal to 92% on room air = 0    Peak Flow (asthma only): not applicable = 0    RSI: 0-4 = See once and convert to home regimen or discontinue        Plan       Goals: medication delivery    Patient/caregiver was educated on the proper method of use for Respiratory Care Devices:  Yes      Level of patient/caregiver understanding able to:   ? Verbalize understanding   ? Demonstrate understanding       ? Teach back        ? Needs reinforcement       ? No available caregiver               ? Other:     Response to education:  Excellent     Is patient being placed on Home Treatment Regimen? No     Does the patient have everything they need prior to discharge? Yes     Comments: admits with pneumonia    Plan of Care: hhn albuterol tid&q4PRN    Electronically signed by Valentino Cates RCP on 1/16/2020 at 2:12 PM    Respiratory Protocol Guidelines     1. Assessment and treatment by Respiratory Therapy will be initiated for medication and therapeutic interventions upon initiation of aerosolized medication. 2. Physician will be contacted for respiratory rate (RR) greater than 35 breaths per minute. Therapy will be held for heart rate (HR) greater than 140 beats per minute, pending direction from physician. 3. Bronchodilators will be administered via Metered Dose Inhaler (MDI) with spacer when the following criteria are met:  a. Alert and cooperative     b. HR < 140 bpm  c. RR < 30 bpm                d. Can demonstrate a 2-3 second inspiratory hold  4. Bronchodilators will be administered via Hand Held Nebulizer [facility]) to patients when ANY of the following criteria are met  a. Incognizant or uncooperative          b. Patients treated with HHN at Home        c. Unable to demonstrate proper use of MDI with spacer     d. RR > 30 bpm   5.  Bronchodilators

## 2020-01-16 NOTE — PROGRESS NOTES
Home Med List is complete. Source of medications in list is patient interview and recent refill history. Patient states he took all of his morning meds today. Please note: 1. Changes made to Home Med List:  · Omeprazole - changed from 20 mg daily to 40 mg daily     2.  Added to the Home Med List:   · Zoloft -- 50 mg nightly prn            [address] Julia Payne intern   1/15/2020 8:49 PM

## 2020-01-16 NOTE — PROGRESS NOTES
4 Eyes Admission Assessment     I agree as the admission nurse that 2 RN's have performed a thorough Head to Toe Skin Assessment on the patient. ALL assessment sites listed below have been assessed on admission. Areas assessed by both nurses:   [x]   Head, Face, and Ears   [x]   Shoulders, Back, and Chest  [x]   Arms, Elbows, and Hands   [x]   Coccyx, Sacrum, and Ischum  [x]   Legs, Feet, and Heels        Does the Patient have Skin Breakdown?   No  Ashutosh Prevention initiated:  NA   Wound Care Orders initiated:  NA      WOC nurse consulted for Pressure Injury (Stage 3,4, Unstageable, DTI, NWPT, and Complex wounds):  NA      Nurse 1 eSignature: Electronically signed by Anuel Abdullahi RN on 1/15/20 at 10:57 PM    **SHARE this note so that the co-signing nurse is able to place an eSignature**    Nurse 2 eSignature: Electronically signed by Rowdy Pearl RN on 1/16/20 at 12:42 AM

## 2020-01-16 NOTE — PLAN OF CARE
Problem: Pain:  Goal: Pain level will decrease  Description  Pain level will decrease  Outcome: Ongoing   Patient states of lung/chest soreness when coughing throughout shift. Patient given robitussin to help control cough. Patient states pain is manageable and does not appear to be in distress or discomfort from pain upon examination. Patient aware to alert RN if pain becomes unmanageable.

## 2020-01-17 LAB
A/G RATIO: 1.1 (ref 1.1–2.2)
ALBUMIN SERPL-MCNC: 3.3 G/DL (ref 3.4–5)
ALP BLD-CCNC: 62 U/L (ref 40–129)
ALT SERPL-CCNC: 39 U/L (ref 10–40)
ANION GAP SERPL CALCULATED.3IONS-SCNC: 13 MMOL/L (ref 3–16)
AST SERPL-CCNC: 23 U/L (ref 15–37)
BASOPHILS ABSOLUTE: 0 K/UL (ref 0–0.2)
BASOPHILS RELATIVE PERCENT: 0.3 %
BILIRUB SERPL-MCNC: 0.4 MG/DL (ref 0–1)
BUN BLDV-MCNC: 12 MG/DL (ref 7–20)
CALCIUM SERPL-MCNC: 8.8 MG/DL (ref 8.3–10.6)
CHLORIDE BLD-SCNC: 104 MMOL/L (ref 99–110)
CO2: 21 MMOL/L (ref 21–32)
CREAT SERPL-MCNC: 0.8 MG/DL (ref 0.9–1.3)
EOSINOPHILS ABSOLUTE: 0 K/UL (ref 0–0.6)
EOSINOPHILS RELATIVE PERCENT: 0.2 %
GFR AFRICAN AMERICAN: >60
GFR NON-AFRICAN AMERICAN: >60
GLOBULIN: 3 G/DL
GLUCOSE BLD-MCNC: 112 MG/DL (ref 70–99)
HCT VFR BLD CALC: 34.5 % (ref 40.5–52.5)
HEMOGLOBIN: 11 G/DL (ref 13.5–17.5)
LYMPHOCYTES ABSOLUTE: 2 K/UL (ref 1–5.1)
LYMPHOCYTES RELATIVE PERCENT: 17 %
MCH RBC QN AUTO: 31.5 PG (ref 26–34)
MCHC RBC AUTO-ENTMCNC: 32 G/DL (ref 31–36)
MCV RBC AUTO: 98.5 FL (ref 80–100)
MONOCYTES ABSOLUTE: 1.2 K/UL (ref 0–1.3)
MONOCYTES RELATIVE PERCENT: 10 %
NEUTROPHILS ABSOLUTE: 8.6 K/UL (ref 1.7–7.7)
NEUTROPHILS RELATIVE PERCENT: 72.5 %
PDW BLD-RTO: 13.1 % (ref 12.4–15.4)
PLATELET # BLD: 492 K/UL (ref 135–450)
PMV BLD AUTO: 7.2 FL (ref 5–10.5)
POTASSIUM REFLEX MAGNESIUM: 4.2 MMOL/L (ref 3.5–5.1)
RBC # BLD: 3.5 M/UL (ref 4.2–5.9)
SODIUM BLD-SCNC: 138 MMOL/L (ref 136–145)
TOTAL PROTEIN: 6.3 G/DL (ref 6.4–8.2)
WBC # BLD: 11.9 K/UL (ref 4–11)

## 2020-01-17 PROCEDURE — 2580000003 HC RX 258: Performed by: INTERNAL MEDICINE

## 2020-01-17 PROCEDURE — 6360000002 HC RX W HCPCS: Performed by: INTERNAL MEDICINE

## 2020-01-17 PROCEDURE — 85025 COMPLETE CBC W/AUTO DIFF WBC: CPT

## 2020-01-17 PROCEDURE — 80053 COMPREHEN METABOLIC PANEL: CPT

## 2020-01-17 PROCEDURE — 94640 AIRWAY INHALATION TREATMENT: CPT

## 2020-01-17 PROCEDURE — 1200000000 HC SEMI PRIVATE

## 2020-01-17 PROCEDURE — G0378 HOSPITAL OBSERVATION PER HR: HCPCS

## 2020-01-17 PROCEDURE — 36415 COLL VENOUS BLD VENIPUNCTURE: CPT

## 2020-01-17 PROCEDURE — 96366 THER/PROPH/DIAG IV INF ADDON: CPT

## 2020-01-17 PROCEDURE — 6370000000 HC RX 637 (ALT 250 FOR IP): Performed by: INTERNAL MEDICINE

## 2020-01-17 PROCEDURE — 94761 N-INVAS EAR/PLS OXIMETRY MLT: CPT

## 2020-01-17 PROCEDURE — 6370000000 HC RX 637 (ALT 250 FOR IP): Performed by: FAMILY MEDICINE

## 2020-01-17 RX ADMIN — PREDNISONE 40 MG: 20 TABLET ORAL at 08:21

## 2020-01-17 RX ADMIN — ALPRAZOLAM 0.5 MG: 0.5 TABLET ORAL at 12:19

## 2020-01-17 RX ADMIN — PANTOPRAZOLE SODIUM 40 MG: 40 TABLET, DELAYED RELEASE ORAL at 08:31

## 2020-01-17 RX ADMIN — GUAIFENESIN AND DEXTROMETHORPHAN 5 ML: 100; 10 SYRUP ORAL at 21:07

## 2020-01-17 RX ADMIN — AZITHROMYCIN MONOHYDRATE 500 MG: 500 INJECTION, POWDER, LYOPHILIZED, FOR SOLUTION INTRAVENOUS at 21:54

## 2020-01-17 RX ADMIN — ALBUTEROL SULFATE 2.5 MG: 2.5 SOLUTION RESPIRATORY (INHALATION) at 23:07

## 2020-01-17 RX ADMIN — GUAIFENESIN AND DEXTROMETHORPHAN 5 ML: 100; 10 SYRUP ORAL at 08:21

## 2020-01-17 RX ADMIN — AMLODIPINE BESYLATE 5 MG: 5 TABLET ORAL at 08:21

## 2020-01-17 RX ADMIN — ALBUTEROL SULFATE 2.5 MG: 2.5 SOLUTION RESPIRATORY (INHALATION) at 07:31

## 2020-01-17 RX ADMIN — GUAIFENESIN AND DEXTROMETHORPHAN 5 ML: 100; 10 SYRUP ORAL at 12:19

## 2020-01-17 RX ADMIN — GUAIFENESIN AND DEXTROMETHORPHAN 5 ML: 100; 10 SYRUP ORAL at 04:25

## 2020-01-17 RX ADMIN — CEFTRIAXONE 1 G: 1 INJECTION, POWDER, FOR SOLUTION INTRAMUSCULAR; INTRAVENOUS at 21:11

## 2020-01-17 RX ADMIN — ALPRAZOLAM 0.5 MG: 0.5 TABLET ORAL at 23:32

## 2020-01-17 RX ADMIN — Medication 10 ML: at 08:31

## 2020-01-17 RX ADMIN — ALBUTEROL SULFATE 2.5 MG: 2.5 SOLUTION RESPIRATORY (INHALATION) at 15:29

## 2020-01-17 RX ADMIN — Medication 10 ML: at 21:15

## 2020-01-17 ASSESSMENT — PAIN DESCRIPTION - PAIN TYPE: TYPE: ACUTE PAIN

## 2020-01-17 ASSESSMENT — PAIN DESCRIPTION - ORIENTATION: ORIENTATION: RIGHT;LEFT

## 2020-01-17 ASSESSMENT — PAIN DESCRIPTION - ONSET: ONSET: ON-GOING

## 2020-01-17 ASSESSMENT — PAIN SCALES - GENERAL
PAINLEVEL_OUTOF10: 0
PAINLEVEL_OUTOF10: 5
PAINLEVEL_OUTOF10: 0

## 2020-01-17 ASSESSMENT — PAIN DESCRIPTION - FREQUENCY: FREQUENCY: INTERMITTENT

## 2020-01-17 ASSESSMENT — PAIN DESCRIPTION - DESCRIPTORS: DESCRIPTORS: ACHING;DISCOMFORT

## 2020-01-17 ASSESSMENT — PAIN DESCRIPTION - LOCATION: LOCATION: CHEST

## 2020-01-17 NOTE — PROGRESS NOTES
Hospitalist Progress Note      PCP: Shawanda Belle MD       Date of Admission: 1/15/2020    Chief Complaint: Cough    Hospital Course: 50-year-old male with a past medical history of asthma, anxiety/bipolar presented to the hospital because of shortness of breath. Found to have multifocal pneumonia on chest x-ray. Admitted to the hospital for pneumonia    Subjective: Patient states that he has been having a lot of coughing. But denies any fevers or chills. Denies any sputum production. Medications:  Reviewed    Infusion Medications   Scheduled Medications    predniSONE  40 mg Oral Daily    amLODIPine  5 mg Oral Daily    pantoprazole  40 mg Oral QAM AC    sodium chloride flush  10 mL Intravenous 2 times per day    enoxaparin  40 mg Subcutaneous Daily    azithromycin  500 mg Intravenous Q24H    And    cefTRIAXone (ROCEPHIN) IV  1 g Intravenous Q24H    albuterol  2.5 mg Nebulization TID     PRN Meds: ALPRAZolam, albuterol, sodium chloride flush, magnesium hydroxide, ondansetron, acetaminophen, guaiFENesin-dextromethorphan      Intake/Output Summary (Last 24 hours) at 1/17/2020 1350  Last data filed at 1/17/2020 1349  Gross per 24 hour   Intake 3172.5 ml   Output 0 ml   Net 3172.5 ml       Physical Exam Performed:    /80   Pulse 111   Temp 98.1 °F (36.7 °C) (Oral)   Resp 20   Ht 5' 10\" (1.778 m)   Wt 173 lb 8 oz (78.7 kg)   SpO2 95%   BMI 24.89 kg/m²     General appearance: No apparent distress, appears stated age and cooperative. HEENT: Pupils equal, round, and reactive to light. Conjunctivae/corneas clear. Neck: Supple, with full range of motion. No jugular venous distention. Trachea midline. Respiratory:  Normal respiratory effort. Clear to auscultation, bilaterally without Rales/Wheezes/Rhonchi. Cardiovascular: Regular rate and rhythm with normal S1/S2 without murmurs, rubs or gallops. Abdomen: Soft, non-tender, non-distended with normal bowel sounds.   Musculoskeletal: No clubbing, cyanosis or edema bilaterally. Full range of motion without deformity. Skin: Skin color, texture, turgor normal.  No rashes or lesions. Neurologic:  Neurovascularly intact without any focal sensory/motor deficits. Cranial nerves: II-XII intact, grossly non-focal.  Psychiatric: Alert and oriented, thought content appropriate, normal insight  Capillary Refill: Brisk,< 3 seconds   Peripheral Pulses: +2 palpable, equal bilaterally       Labs:   Recent Labs     01/15/20  2039 01/16/20  0507 01/17/20  0548   WBC 13.0* 8.7 11.9*   HGB 12.9* 11.0* 11.0*   HCT 40.1* 33.8* 34.5*   * 523* 492*     Recent Labs     01/15/20  2039 01/16/20  0507 01/17/20  0548    138 138   K 4.0 4.1 4.2   CL 99 105 104   CO2 22 20* 21   BUN 10 10 12   CREATININE 0.7* 0.7* 0.8*   CALCIUM 9.2 8.5 8.8     Recent Labs     01/17/20  0548   AST 23   ALT 39   BILITOT 0.4   ALKPHOS 62     No results for input(s): INR in the last 72 hours. No results for input(s): Marianne Tariq in the last 72 hours. Urinalysis:    No results found for: Tia Tamia, BACTERIA, RBCUA, BLOODU, SPECGRAV, Kellee [address]    Radiology:  CT CHEST PULMONARY EMBOLISM W CONTRAST   Final Result      1. No CT findings of pulmonary thromboembolism on the current exam.      2.  Multifocal patchy airspace disease most pronounced in the right lung consistent with multifocal pneumonia. Correlate clinically. 3.  Small left and moderate right pleural effusion with underlying compressive atelectasis. 4.  Diffuse hepatic steatosis. Assessment/Plan:    Active Hospital Problems    Diagnosis    Pneumonia [J18.9]     1. Multifocal pneumonia:  -cont. Rocephin and Azithromycin for now. Added prednisone. -CT chest negative for pulmonary medicine, showing right-sided multifocal pneumonia  -duonebs. -monitor.  -not requiring O2.     2. Anxiety: resume home Xanax prn.     3. Asthma: chronic, possibly mild exacerbation.  -duonebs, added prednisone.  -monitor.     4. Bipolar disorder: needs to be monitored as outpatient psych.       DVT Prophylaxis: lovenox  Diet: DIET GENERAL;  Code Status: Full Code    PT/OT Eval Status: deferred    Dispo -discharge possibly tomorrow    Savage Rodirguez MD

## 2020-01-17 NOTE — PROGRESS NOTES
Pt c/o coughing spasms and requested prn robitussin for cough. Pt also requested xanax for increased anxiety. Pt administered prn as requested. Call light within reach, pt's Mother the bedside. Will continue to monitor.

## 2020-01-18 VITALS
WEIGHT: 173.5 LBS | HEIGHT: 70 IN | BODY MASS INDEX: 24.84 KG/M2 | TEMPERATURE: 98.6 F | RESPIRATION RATE: 16 BRPM | HEART RATE: 121 BPM | OXYGEN SATURATION: 94 % | SYSTOLIC BLOOD PRESSURE: 118 MMHG | DIASTOLIC BLOOD PRESSURE: 81 MMHG

## 2020-01-18 LAB
ANION GAP SERPL CALCULATED.3IONS-SCNC: 16 MMOL/L (ref 3–16)
BASOPHILS ABSOLUTE: 0.1 K/UL (ref 0–0.2)
BASOPHILS RELATIVE PERCENT: 0.7 %
BUN BLDV-MCNC: 14 MG/DL (ref 7–20)
CALCIUM SERPL-MCNC: 8.7 MG/DL (ref 8.3–10.6)
CHLORIDE BLD-SCNC: 105 MMOL/L (ref 99–110)
CO2: 19 MMOL/L (ref 21–32)
CREAT SERPL-MCNC: 0.9 MG/DL (ref 0.9–1.3)
EOSINOPHILS ABSOLUTE: 0 K/UL (ref 0–0.6)
EOSINOPHILS RELATIVE PERCENT: 0.3 %
GFR AFRICAN AMERICAN: >60
GFR NON-AFRICAN AMERICAN: >60
GLUCOSE BLD-MCNC: 113 MG/DL (ref 70–99)
HCT VFR BLD CALC: 32.9 % (ref 40.5–52.5)
HEMOGLOBIN: 10.6 G/DL (ref 13.5–17.5)
LYMPHOCYTES ABSOLUTE: 2.3 K/UL (ref 1–5.1)
LYMPHOCYTES RELATIVE PERCENT: 16.6 %
MCH RBC QN AUTO: 31.7 PG (ref 26–34)
MCHC RBC AUTO-ENTMCNC: 32.2 G/DL (ref 31–36)
MCV RBC AUTO: 98.3 FL (ref 80–100)
MONOCYTES ABSOLUTE: 1.5 K/UL (ref 0–1.3)
MONOCYTES RELATIVE PERCENT: 11.1 %
NEUTROPHILS ABSOLUTE: 9.8 K/UL (ref 1.7–7.7)
NEUTROPHILS RELATIVE PERCENT: 71.3 %
PDW BLD-RTO: 13.1 % (ref 12.4–15.4)
PLATELET # BLD: 427 K/UL (ref 135–450)
PMV BLD AUTO: 7.4 FL (ref 5–10.5)
POTASSIUM REFLEX MAGNESIUM: 4.1 MMOL/L (ref 3.5–5.1)
RBC # BLD: 3.35 M/UL (ref 4.2–5.9)
SODIUM BLD-SCNC: 140 MMOL/L (ref 136–145)
WBC # BLD: 13.8 K/UL (ref 4–11)

## 2020-01-18 PROCEDURE — 94640 AIRWAY INHALATION TREATMENT: CPT

## 2020-01-18 PROCEDURE — 6360000002 HC RX W HCPCS: Performed by: INTERNAL MEDICINE

## 2020-01-18 PROCEDURE — G0378 HOSPITAL OBSERVATION PER HR: HCPCS

## 2020-01-18 PROCEDURE — 2580000003 HC RX 258: Performed by: INTERNAL MEDICINE

## 2020-01-18 PROCEDURE — 80048 BASIC METABOLIC PNL TOTAL CA: CPT

## 2020-01-18 PROCEDURE — 6370000000 HC RX 637 (ALT 250 FOR IP): Performed by: FAMILY MEDICINE

## 2020-01-18 PROCEDURE — 6370000000 HC RX 637 (ALT 250 FOR IP): Performed by: INTERNAL MEDICINE

## 2020-01-18 PROCEDURE — 36415 COLL VENOUS BLD VENIPUNCTURE: CPT

## 2020-01-18 PROCEDURE — 85025 COMPLETE CBC W/AUTO DIFF WBC: CPT

## 2020-01-18 RX ORDER — CEFUROXIME AXETIL 500 MG/1
500 TABLET ORAL 2 TIMES DAILY
Qty: 10 TABLET | Refills: 0 | Status: SHIPPED | OUTPATIENT
Start: 2020-01-18 | End: 2020-01-23

## 2020-01-18 RX ORDER — ALBUTEROL SULFATE 90 UG/1
2 AEROSOL, METERED RESPIRATORY (INHALATION) 4 TIMES DAILY PRN
Qty: 1 INHALER | Refills: 0 | Status: SHIPPED | OUTPATIENT
Start: 2020-01-18

## 2020-01-18 RX ORDER — GUAIFENESIN 100 MG/5ML
10 SYRUP ORAL EVERY 4 HOURS PRN
Qty: 110 ML | Refills: 0 | Status: SHIPPED | OUTPATIENT
Start: 2020-01-18

## 2020-01-18 RX ORDER — PREDNISONE 20 MG/1
40 TABLET ORAL DAILY
Qty: 8 TABLET | Refills: 0 | Status: SHIPPED | OUTPATIENT
Start: 2020-01-19 | End: 2020-01-23

## 2020-01-18 RX ORDER — DOXYCYCLINE HYCLATE 100 MG
100 TABLET ORAL 2 TIMES DAILY
Qty: 10 TABLET | Refills: 0 | Status: SHIPPED | OUTPATIENT
Start: 2020-01-18 | End: 2020-01-23

## 2020-01-18 RX ORDER — BUDESONIDE AND FORMOTEROL FUMARATE DIHYDRATE 160; 4.5 UG/1; UG/1
2 AEROSOL RESPIRATORY (INHALATION) 2 TIMES DAILY
Qty: 1 INHALER | Refills: 0 | Status: SHIPPED | OUTPATIENT
Start: 2020-01-18 | End: 2020-02-24

## 2020-01-18 RX ADMIN — PREDNISONE 40 MG: 20 TABLET ORAL at 09:34

## 2020-01-18 RX ADMIN — ALBUTEROL SULFATE 2.5 MG: 2.5 SOLUTION RESPIRATORY (INHALATION) at 10:38

## 2020-01-18 RX ADMIN — ACETAMINOPHEN 650 MG: 325 TABLET ORAL at 06:14

## 2020-01-18 RX ADMIN — GUAIFENESIN AND DEXTROMETHORPHAN 5 ML: 100; 10 SYRUP ORAL at 12:39

## 2020-01-18 RX ADMIN — GUAIFENESIN AND DEXTROMETHORPHAN 5 ML: 100; 10 SYRUP ORAL at 05:59

## 2020-01-18 RX ADMIN — Medication 10 ML: at 09:34

## 2020-01-18 RX ADMIN — ALPRAZOLAM 0.5 MG: 0.5 TABLET ORAL at 12:39

## 2020-01-18 RX ADMIN — AMLODIPINE BESYLATE 5 MG: 5 TABLET ORAL at 09:34

## 2020-01-18 ASSESSMENT — PAIN SCALES - GENERAL
PAINLEVEL_OUTOF10: 0
PAINLEVEL_OUTOF10: 0

## 2020-01-18 NOTE — PLAN OF CARE
Problem: Pain:  Description  Pain management should include both nonpharmacologic and pharmacologic interventions. Goal: Pain level will decrease  Outcome: Ongoing  Note:   Patient denied pain during shift. VSS except hr slightly elevated. Patient in bed resting without any complaints at this time. Call light in reach. Will continue to monitor.

## 2020-01-18 NOTE — PROGRESS NOTES
Patient discharged home with his mother. Discharge instructions reviewed with patient and he verbalizes understanding. IV removed, site unremarkable. His insurance will not pay for symbicort, [facility] will substitute Dulera. He will need to pay for the albuterol as it is too early to refill. Dr. LEAL [facility] ETOWAH aware. Taken by wheelchair to lobby. Terry arranged to take patient and his mother to her house.

## 2020-01-20 ENCOUNTER — TELEPHONE (OUTPATIENT)
Dept: PRIMARY CARE CLINIC | Age: 50
End: 2020-01-20

## 2020-01-20 LAB
BLOOD CULTURE, ROUTINE: NORMAL
CULTURE, BLOOD 2: NORMAL

## 2020-01-23 NOTE — DISCHARGE SUMMARY
Hospitalist Discharge Summary    Patient ID:  Nava Mendoza  [id number]  75 y.o.  1970    Admit date: 1/15/2020    Discharge date: 1/18/2020    Disposition: home    Admission Diagnoses:   Patient Active Problem List   Diagnosis    Anxiety    Asthma    Bipolar affective disorder ([facility] [address]    Depression    Erectile dysfunction    Pneumonia       Discharge Diagnoses: Active Problems:    Pneumonia  Resolved Problems:    * No resolved hospital problems. *      Code Status:  Prior    Condition:  Stable    Discharge Diet: Diet:  No diet orders on file    PCP to do list: Follow-up for improvement of symptoms of pneumonia    Hospital Course: 81-year-old male with past medical history of asthma, anxiety, bipolar disorder presented to the hospital with a complaint of shortness of breath. Apparently patient had visited awkward ER earlier and then chest x-ray showed multifocal pneumonia. Patient also had been to 2 I had completed a course of prednisone azithromycin with no significant improvement of her symptoms. Different urgent cares in the past week arrival to the hospital patient was noted to be tachycardic but was in no respiratory distress. CT angiogram of the chest was performed which did not show any pulmonary embolism but did show multifocal pneumonia. Patient was started on Rocephin azithromycin along with prednisone for community-acquired pneumonia. During hospital course patient symptoms started to improve and once his breathing has stabilized and his cough is also better he was discharged home in stable condition to complete treatment of his pneumonia with oral antibiotics and oral prednisone course for his asthma exacerbation. Patient was discharged home in stable condition.       Discharge Medications:   Discharge Medication List as of 1/18/2020 12:25 PM      START taking these medications    Details   doxycycline hyclate (VIBRA-TABS) 100 MG tablet Take 1 tablet by mouth 2 wheezes s  Abd: bowel sounds present, soft, nontender, nondistended, no masses  Extrem:  No clubbing, cyanosis,  no edema  Skin: no lesion or masses  Psych:  A & O x3  Neuro: grossly intact, moves all four extremities    Pertinent Studies During Hospital Stay:  Radiology:  Xr Chest Standard (2 Vw)    Result Date: 1/15/2020  XR CHEST (2 VW) Indication: Cough shortness of breath COMPARISON: None Findings: PA and lateral views of the chest were obtained. The heart is normal in size and configuration. There are multifocal pulmonary opacities which are most confluent of the right perihilar region and right lung base. In addition, there is a airspace opacity of the left lung base. Tiny nodule of the right lateral lung field may represent a calcified granuloma. No pneumothorax. Likely trace left effusion. Impression: Multifocal consolidative opacities suggesting multifocal pneumonia. Trace left effusion. Ct Chest Pulmonary Embolism W Contrast    Result Date: 1/16/2020  EXAM: CT ANGIOGRAPHY CHEST WITH CONTRAST (PULMONARY EMBOLUS PROTOCOL) INDICATION: r/o PE, COMPARISON: None. TECHNIQUE: Axial CT imaging obtained through the chest using CT pulmonary angiogram protocol. Axial images, multiplanar reformatted images and maximum intensity projection images were reviewed for CT angiographic technique. Individualized dose optimization technique was used in order to meet ALARA standards for radiation dose reduction. In addition to vendor specific dose reduction algorithms, the dose reduction techniques vary based on the specific scanner utilized but frequently include automated exposure control, adjustment of the mA and/or kV according to patient size, and use of iterative reconstruction technique. IV Contrast Media and volume: 80 mL Isovue-370 FINDINGS: DIAGNOSTIC QUALITY: Adequate. PULMONARY EMBOLI: There is contrast opacification of the pulmonary arteries.   No filling defects or findings for pulmonary thromboembolism on the current study. RIGHT HEART STRAIN: None. LUNGS AND AIRWAYS: The tracheobronchial tree is patent. There is patchy multifocal airspace disease most pronounced in the right lung. There is small left and moderate right pleural effusion with underlying compressive atelectasis. Mild consolidation in the left lung base. No discrete mass or pulmonary nodule identified. HEART / GREAT VESSELS: Heart size appears at the upper limits of normal.  No pericardial effusion seen. ADENOPATHY: No axillary, hilar, or mediastinal lymphadenopathy seen. CHEST WALL / LOWER NECK: No significant abnormality. UPPER ABDOMEN: Diffuse low-attenuation in the liver suggest probable hepatic steatosis. BONES: No destructive process. 1.  No CT findings of pulmonary thromboembolism on the current exam. 2.  Multifocal patchy airspace disease most pronounced in the right lung consistent with multifocal pneumonia. Correlate clinically. 3.  Small left and moderate right pleural effusion with underlying compressive atelectasis. 4.  Diffuse hepatic steatosis. Last Labs on Discharge:     No results found for this or any previous visit (from the past 24 hour(s)). Follow up: with Guillaume Talamantes MD    Note that over 30 minutes was spent in preparing discharge papers, discussing discharge with patient, medication review, etc.    Thank you Guillaume Talamantes MD for the opportunity to be involved in this patient's care. If you have any questions or concerns please feel free to contact me at [phone].     Electronically signed by Giovanna Saint, MD on 1/23/2020 at 2:33 PM

## 2020-01-24 ENCOUNTER — TELEPHONE (OUTPATIENT)
Dept: OTHER | Facility: CLINIC | Age: 50
End: 2020-01-24

## 2020-01-24 ENCOUNTER — APPOINTMENT (OUTPATIENT)
Dept: GENERAL RADIOLOGY | Age: 50
DRG: 720 | End: 2020-01-24
Payer: MEDICAID

## 2020-01-24 ENCOUNTER — HOSPITAL ENCOUNTER (INPATIENT)
Age: 50
LOS: 6 days | Discharge: HOME | DRG: 720 | End: 2020-01-30
Attending: EMERGENCY MEDICINE | Admitting: HOSPITALIST
Payer: MEDICAID

## 2020-01-24 LAB
A/G RATIO: 0.9 (ref 1.1–2.2)
ALBUMIN SERPL-MCNC: 2.8 G/DL (ref 3.4–5)
ALP BLD-CCNC: 68 U/L (ref 40–129)
ALT SERPL-CCNC: 51 U/L (ref 10–40)
ANION GAP SERPL CALCULATED.3IONS-SCNC: 12 MMOL/L (ref 3–16)
APTT: 27.2 SEC (ref 24.2–36.2)
AST SERPL-CCNC: 21 U/L (ref 15–37)
BASOPHILS ABSOLUTE: 0.1 K/UL (ref 0–0.2)
BASOPHILS RELATIVE PERCENT: 0.6 %
BILIRUB SERPL-MCNC: 0.6 MG/DL (ref 0–1)
BUN BLDV-MCNC: 11 MG/DL (ref 7–20)
CALCIUM SERPL-MCNC: 8.4 MG/DL (ref 8.3–10.6)
CHLORIDE BLD-SCNC: 101 MMOL/L (ref 99–110)
CO2: 22 MMOL/L (ref 21–32)
CREAT SERPL-MCNC: 0.6 MG/DL (ref 0.9–1.3)
EKG ATRIAL RATE: 121 BPM
EKG DIAGNOSIS: NORMAL
EKG P AXIS: 66 DEGREES
EKG P-R INTERVAL: 126 MS
EKG Q-T INTERVAL: 284 MS
EKG QRS DURATION: 80 MS
EKG QTC CALCULATION (BAZETT): 403 MS
EKG R AXIS: -3 DEGREES
EKG T AXIS: 39 DEGREES
EKG VENTRICULAR RATE: 121 BPM
EOSINOPHILS ABSOLUTE: 0.2 K/UL (ref 0–0.6)
EOSINOPHILS RELATIVE PERCENT: 1.1 %
GFR AFRICAN AMERICAN: >60
GFR NON-AFRICAN AMERICAN: >60
GLOBULIN: 3.1 G/DL
GLUCOSE BLD-MCNC: 97 MG/DL (ref 70–99)
HCT VFR BLD CALC: 35.2 % (ref 40.5–52.5)
HEMOGLOBIN: 11.2 G/DL (ref 13.5–17.5)
INR BLD: 1.16 (ref 0.86–1.14)
LACTIC ACID: 1.1 MMOL/L (ref 0.4–2)
LYMPHOCYTES ABSOLUTE: 1.2 K/UL (ref 1–5.1)
LYMPHOCYTES RELATIVE PERCENT: 8.3 %
MAGNESIUM: 1.9 MG/DL (ref 1.8–2.4)
MCH RBC QN AUTO: 31.5 PG (ref 26–34)
MCHC RBC AUTO-ENTMCNC: 31.8 G/DL (ref 31–36)
MCV RBC AUTO: 99.1 FL (ref 80–100)
MONOCYTES ABSOLUTE: 1.1 K/UL (ref 0–1.3)
MONOCYTES RELATIVE PERCENT: 7.6 %
NEUTROPHILS ABSOLUTE: 12 K/UL (ref 1.7–7.7)
NEUTROPHILS RELATIVE PERCENT: 82.4 %
PDW BLD-RTO: 13.5 % (ref 12.4–15.4)
PLATELET # BLD: 431 K/UL (ref 135–450)
PMV BLD AUTO: 7.1 FL (ref 5–10.5)
POTASSIUM SERPL-SCNC: 4.1 MMOL/L (ref 3.5–5.1)
PRO-BNP: 3189 PG/ML (ref 0–124)
PROTHROMBIN TIME: 13.5 SEC (ref 10–13.2)
RAPID INFLUENZA  B AGN: NEGATIVE
RAPID INFLUENZA A AGN: NEGATIVE
RBC # BLD: 3.55 M/UL (ref 4.2–5.9)
SODIUM BLD-SCNC: 135 MMOL/L (ref 136–145)
TOTAL PROTEIN: 5.9 G/DL (ref 6.4–8.2)
TROPONIN: <0.01 NG/ML
WBC # BLD: 14.6 K/UL (ref 4–11)

## 2020-01-24 PROCEDURE — 83880 ASSAY OF NATRIURETIC PEPTIDE: CPT

## 2020-01-24 PROCEDURE — 6360000002 HC RX W HCPCS: Performed by: PHYSICIAN ASSISTANT

## 2020-01-24 PROCEDURE — 85730 THROMBOPLASTIN TIME PARTIAL: CPT

## 2020-01-24 PROCEDURE — 71046 X-RAY EXAM CHEST 2 VIEWS: CPT

## 2020-01-24 PROCEDURE — 93970 EXTREMITY STUDY: CPT

## 2020-01-24 PROCEDURE — 87804 INFLUENZA ASSAY W/OPTIC: CPT

## 2020-01-24 PROCEDURE — 83735 ASSAY OF MAGNESIUM: CPT

## 2020-01-24 PROCEDURE — 6370000000 HC RX 637 (ALT 250 FOR IP): Performed by: HOSPITALIST

## 2020-01-24 PROCEDURE — 6370000000 HC RX 637 (ALT 250 FOR IP): Performed by: PHYSICIAN ASSISTANT

## 2020-01-24 PROCEDURE — 2580000003 HC RX 258

## 2020-01-24 PROCEDURE — 6360000002 HC RX W HCPCS: Performed by: HOSPITALIST

## 2020-01-24 PROCEDURE — 85025 COMPLETE CBC W/AUTO DIFF WBC: CPT

## 2020-01-24 PROCEDURE — 36415 COLL VENOUS BLD VENIPUNCTURE: CPT

## 2020-01-24 PROCEDURE — 2580000003 HC RX 258: Performed by: HOSPITALIST

## 2020-01-24 PROCEDURE — 84484 ASSAY OF TROPONIN QUANT: CPT

## 2020-01-24 PROCEDURE — 1200000000 HC SEMI PRIVATE

## 2020-01-24 PROCEDURE — 2580000003 HC RX 258: Performed by: PHYSICIAN ASSISTANT

## 2020-01-24 PROCEDURE — 94761 N-INVAS EAR/PLS OXIMETRY MLT: CPT

## 2020-01-24 PROCEDURE — 93005 ELECTROCARDIOGRAM TRACING: CPT | Performed by: PHYSICIAN ASSISTANT

## 2020-01-24 PROCEDURE — 87040 BLOOD CULTURE FOR BACTERIA: CPT

## 2020-01-24 PROCEDURE — 80053 COMPREHEN METABOLIC PANEL: CPT

## 2020-01-24 PROCEDURE — 83605 ASSAY OF LACTIC ACID: CPT

## 2020-01-24 PROCEDURE — 99291 CRITICAL CARE FIRST HOUR: CPT

## 2020-01-24 PROCEDURE — 85610 PROTHROMBIN TIME: CPT

## 2020-01-24 PROCEDURE — 93010 ELECTROCARDIOGRAM REPORT: CPT | Performed by: INTERNAL MEDICINE

## 2020-01-24 PROCEDURE — 94640 AIRWAY INHALATION TREATMENT: CPT

## 2020-01-24 RX ORDER — VANCOMYCIN HYDROCHLORIDE 1 G/200ML
1000 INJECTION, SOLUTION INTRAVENOUS ONCE
Status: DISCONTINUED | OUTPATIENT
Start: 2020-01-24 | End: 2020-01-24

## 2020-01-24 RX ORDER — IPRATROPIUM BROMIDE AND ALBUTEROL SULFATE 2.5; .5 MG/3ML; MG/3ML
1 SOLUTION RESPIRATORY (INHALATION)
Status: DISCONTINUED | OUTPATIENT
Start: 2020-01-24 | End: 2020-01-30 | Stop reason: HOSPADM

## 2020-01-24 RX ORDER — ALBUTEROL SULFATE 90 UG/1
2 AEROSOL, METERED RESPIRATORY (INHALATION) 4 TIMES DAILY PRN
Status: DISCONTINUED | OUTPATIENT
Start: 2020-01-24 | End: 2020-01-30 | Stop reason: HOSPADM

## 2020-01-24 RX ORDER — ALPRAZOLAM 0.5 MG/1
0.5 TABLET ORAL 2 TIMES DAILY PRN
Status: DISCONTINUED | OUTPATIENT
Start: 2020-01-24 | End: 2020-01-26

## 2020-01-24 RX ORDER — LEVALBUTEROL INHALATION SOLUTION 0.63 MG/3ML
1.26 SOLUTION RESPIRATORY (INHALATION) ONCE
Status: COMPLETED | OUTPATIENT
Start: 2020-01-24 | End: 2020-01-24

## 2020-01-24 RX ORDER — SODIUM CHLORIDE 9 MG/ML
INJECTION, SOLUTION INTRAVENOUS
Status: COMPLETED
Start: 2020-01-24 | End: 2020-01-24

## 2020-01-24 RX ORDER — METHYLPREDNISOLONE SODIUM SUCCINATE 125 MG/2ML
125 INJECTION, POWDER, LYOPHILIZED, FOR SOLUTION INTRAMUSCULAR; INTRAVENOUS ONCE
Status: COMPLETED | OUTPATIENT
Start: 2020-01-24 | End: 2020-01-24

## 2020-01-24 RX ORDER — SODIUM CHLORIDE 0.9 % (FLUSH) 0.9 %
10 SYRINGE (ML) INJECTION EVERY 12 HOURS SCHEDULED
Status: DISCONTINUED | OUTPATIENT
Start: 2020-01-24 | End: 2020-01-30 | Stop reason: HOSPADM

## 2020-01-24 RX ORDER — GUAIFENESIN 100 MG/5ML
200 SOLUTION ORAL ONCE
Status: COMPLETED | OUTPATIENT
Start: 2020-01-24 | End: 2020-01-24

## 2020-01-24 RX ORDER — SODIUM CHLORIDE 0.9 % (FLUSH) 0.9 %
10 SYRINGE (ML) INJECTION PRN
Status: DISCONTINUED | OUTPATIENT
Start: 2020-01-24 | End: 2020-01-30 | Stop reason: HOSPADM

## 2020-01-24 RX ORDER — PANTOPRAZOLE SODIUM 40 MG/1
40 TABLET, DELAYED RELEASE ORAL
Status: DISCONTINUED | OUTPATIENT
Start: 2020-01-25 | End: 2020-01-30 | Stop reason: HOSPADM

## 2020-01-24 RX ORDER — AMLODIPINE BESYLATE 5 MG/1
5 TABLET ORAL DAILY
Status: DISCONTINUED | OUTPATIENT
Start: 2020-01-25 | End: 2020-01-27

## 2020-01-24 RX ORDER — GUAIFENESIN 100 MG/5ML
10 SYRUP ORAL EVERY 4 HOURS PRN
Status: DISCONTINUED | OUTPATIENT
Start: 2020-01-24 | End: 2020-01-24 | Stop reason: ALTCHOICE

## 2020-01-24 RX ORDER — ONDANSETRON 2 MG/ML
4 INJECTION INTRAMUSCULAR; INTRAVENOUS EVERY 6 HOURS PRN
Status: DISCONTINUED | OUTPATIENT
Start: 2020-01-24 | End: 2020-01-30 | Stop reason: HOSPADM

## 2020-01-24 RX ORDER — CODEINE PHOSPHATE AND GUAIFENESIN 10; 100 MG/5ML; MG/5ML
5 SOLUTION ORAL EVERY 4 HOURS PRN
Status: DISCONTINUED | OUTPATIENT
Start: 2020-01-24 | End: 2020-01-26

## 2020-01-24 RX ORDER — BENZONATATE 100 MG/1
100 CAPSULE ORAL ONCE
Status: COMPLETED | OUTPATIENT
Start: 2020-01-24 | End: 2020-01-24

## 2020-01-24 RX ORDER — GUAIFENESIN/DEXTROMETHORPHAN 100-10MG/5
5 SYRUP ORAL EVERY 4 HOURS PRN
Status: DISCONTINUED | OUTPATIENT
Start: 2020-01-24 | End: 2020-01-30 | Stop reason: HOSPADM

## 2020-01-24 RX ADMIN — VANCOMYCIN HYDROCHLORIDE 1250 MG: 10 INJECTION, POWDER, LYOPHILIZED, FOR SOLUTION INTRAVENOUS at 13:53

## 2020-01-24 RX ADMIN — GUAIFENESIN 200 MG: 100 SOLUTION ORAL at 14:10

## 2020-01-24 RX ADMIN — BENZONATATE 100 MG: 100 CAPSULE ORAL at 13:12

## 2020-01-24 RX ADMIN — Medication 10 ML: at 21:31

## 2020-01-24 RX ADMIN — Medication 2 PUFF: at 19:45

## 2020-01-24 RX ADMIN — LEVALBUTEROL 1.26 MG: 0.63 SOLUTION RESPIRATORY (INHALATION) at 12:56

## 2020-01-24 RX ADMIN — CEFEPIME HYDROCHLORIDE 1 G: 1 INJECTION, POWDER, FOR SOLUTION INTRAMUSCULAR; INTRAVENOUS at 12:43

## 2020-01-24 RX ADMIN — CEFEPIME HYDROCHLORIDE 2 G: 2 INJECTION, POWDER, FOR SOLUTION INTRAVENOUS at 23:17

## 2020-01-24 RX ADMIN — METHYLPREDNISOLONE SODIUM SUCCINATE 125 MG: 125 INJECTION, POWDER, FOR SOLUTION INTRAMUSCULAR; INTRAVENOUS at 12:43

## 2020-01-24 RX ADMIN — ENOXAPARIN SODIUM 40 MG: 40 INJECTION SUBCUTANEOUS at 21:31

## 2020-01-24 RX ADMIN — IPRATROPIUM BROMIDE AND ALBUTEROL SULFATE 1 AMPULE: .5; 3 SOLUTION RESPIRATORY (INHALATION) at 19:44

## 2020-01-24 RX ADMIN — SODIUM CHLORIDE: 9 INJECTION, SOLUTION INTRAVENOUS at 23:39

## 2020-01-24 ASSESSMENT — ENCOUNTER SYMPTOMS
TROUBLE SWALLOWING: 0
ABDOMINAL PAIN: 0
DIARRHEA: 0
SORE THROAT: 0
COUGH: 1
CONSTIPATION: 0
STRIDOR: 0
WHEEZING: 1
ABDOMINAL DISTENTION: 0
BACK PAIN: 0
COLOR CHANGE: 0
NAUSEA: 0
VOMITING: 0
SHORTNESS OF BREATH: 1
VOICE CHANGE: 0

## 2020-01-24 ASSESSMENT — PAIN SCALES - GENERAL: PAINLEVEL_OUTOF10: 0

## 2020-01-24 ASSESSMENT — PAIN DESCRIPTION - PAIN TYPE: TYPE: ACUTE PAIN

## 2020-01-24 ASSESSMENT — PAIN DESCRIPTION - LOCATION: LOCATION: RIB CAGE

## 2020-01-24 NOTE — H&P
Component Value Date    WBC 14.6 01/24/2020    RBC 3.55 01/24/2020    HGB 11.2 01/24/2020    HCT 35.2 01/24/2020    MCV 99.1 01/24/2020    MCH 31.5 01/24/2020    MCHC 31.8 01/24/2020    RDW 13.5 01/24/2020     01/24/2020    MPV 7.1 01/24/2020     BMP:    Lab Results   Component Value Date     01/24/2020    K 4.1 01/24/2020    K 4.1 01/18/2020     01/24/2020    CO2 22 01/24/2020    BUN 11 01/24/2020    CREATININE 0.6 01/24/2020    CALCIUM 8.4 01/24/2020    GFRAA >60 01/24/2020    GFRAA >60 04/20/2010    LABGLOM >60 01/24/2020    GLUCOSE 97 01/24/2020       Chest Xray:   EKG:    I visualized CXR images and EKG strips    Problem List  Active Problems:    Pneumonia  Resolved Problems:    * No resolved hospital problems. *        Assessment/Plan:   Pneumonia  - with CXR showing worsening pna  - treat  With iv abx  - supportive care  - monitor for improvement .     Sepsis present on admission      Teofilo Kocher, MD    1/24/2020 2:06 PM

## 2020-01-24 NOTE — ED NOTES
Pharmacy Medication History Note      List of current medications patient is taking is complete. Source of information: patient    Changes made to medication list:  Medications flagged for removal (include reason, ex. noncompliance):  N/A      Medications removed (include reason, ex. therapy complete or physician discontinued):  N/A    Medications added/doses adjusted:  N/A    Other notes (ex. Recent course of antibiotics, Coumadin dosing):  Denies use of other OTC or herbal medications. Last dose times updated. Kyle Valdez [facility]    No current facility-administered medications on file prior to encounter. Current Outpatient Medications on File Prior to Encounter   Medication Sig Dispense Refill    budesonide-formoterol (SYMBICORT) 160-4.5 MCG/ACT AERO Inhale 2 puffs into the lungs 2 times daily 1 Inhaler 0    albuterol sulfate  (90 Base) MCG/ACT inhaler Inhale 2 puffs into the lungs 4 times daily as needed for Wheezing 1 Inhaler 0    guaiFENesin (ALTARUSSIN) 100 MG/5ML syrup Take 10 mLs by mouth every 4 hours as needed for Cough 110 mL 0    omeprazole (PRILOSEC) 40 MG delayed release capsule Take 40 mg by mouth daily       ALPRAZolam (XANAX) 0.5 MG tablet Take 0.5 mg by mouth 2 times daily as needed.        sertraline (ZOLOFT) 50 MG tablet Take 50 mg by mouth nightly as needed      amLODIPine (NORVASC) 5 MG tablet Take 5 mg by mouth daily      [] doxycycline hyclate (VIBRA-TABS) 100 MG tablet Take 1 tablet by mouth 2 times daily for 5 days 10 tablet 0    [] cefUROXime (CEFTIN) 500 MG tablet Take 1 tablet by mouth 2 times daily for 5 days 10 tablet 0    [] predniSONE (DELTASONE) 20 MG tablet Take 2 tablets by mouth daily for 4 days 8 tablet 0

## 2020-01-24 NOTE — ED NOTES
Patient report given to floor nurse at bedside, patient transported to floor in stable condition.         Kevin Estrada RN  01/24/20 6177

## 2020-01-24 NOTE — PROGRESS NOTES
Pt arrived to 5T. Oriented to room. Light within reach. Denies further needs. Will continue to monitor.

## 2020-01-24 NOTE — ED NOTES
Patient into ER from home where he was recently admitted to [facility]. for pneumonia, patient reporting that he feels worse that he did. Xray shows worsening pneumonia, patient placed in bed in hallway, IV started blood sent to lab along with the flu swab. Patient medicated per [facility], will continue to monitor.       Elizabeth Warren RN  01/24/20 4656

## 2020-01-24 NOTE — ED PROVIDER NOTES
I personally evaluated and examined the patient in conjunction with the LILLY and agree with the assessment, treatment plan and disposition of the patient as recorded by the LILLY. I reviewed pertinent nursing notes, triage notes, vital signs, past medical history, family and social history, medications, and allergies. Complete review of systems was conducted by the LILLY and/or myself. Review of systems is negative except as documented in the history of present illness. Brief HPI: 71-year-old male presents emergency department with chief complaint of increasing shortness of breath. Recently admitted at [facility] for pneumonia. History of asthma. No DVT, PE or history of congestive heart failure. Reports that he got a lot of IV fluids when he was hospitalized at [facility]. still has some lower extremity edema but no associated calf pain. No associated fever. Cough is dry. Physical Exam: General: Patient is in no acute distress but ill-appearing. Head: Normocephalic, atraumatic, pupils are equal and reactive to light. EOMI. Neck: Neck is supple. No JVD noted. Heart: Regular rhythm. Tachycardic. No murmurs, rubs, or gallops   Lungs: Diminished bilaterally with associated crackles. Abdomen: soft, non-tender, non-distended   Extremities: 2+ bilateral lower extremity edema. No calf pain. Capillary refill is less than 2 seconds   Skin: no cyanosis or pallor; no rashes noted   Neuro: CN's 2-12 are grossly intact. No focal neurologic deficit appreciated. Cardiac work-up, breathing treatments, steroids ordered. Patient refused sepsis IV fluids given his recent swelling. He has an elevated white blood cell count chest x-ray which shows worsening multifocal pneumonia. Broad-spectrum antibiotics to cover for nosocomial coverage initiated. Flu swab is negative. He is admitted for hospitalist for further management.   Also note the patient had a recent CT PE protocol that was negative. This was discussed with hospitalist.  Accepts for admission. EKG: EKG interpretation by ED physician: Normal axis, sinus tachycardia at 121 bpm.  Nonspecific T wave flattening laterally. No old EKGs to compare this to. FINAL IMPRESSION     1. HCAP (healthcare-associated pneumonia)    2. EKG abnormalities    3. Septicemia ([facility]    4. Peripheral edema            Electronically signed by:   Bethany Coles DO  01/24/20 5063

## 2020-01-25 LAB
ALBUMIN SERPL-MCNC: 3 G/DL (ref 3.4–5)
ANION GAP SERPL CALCULATED.3IONS-SCNC: 10 MMOL/L (ref 3–16)
BASOPHILS ABSOLUTE: 0 K/UL (ref 0–0.2)
BASOPHILS RELATIVE PERCENT: 0.3 %
BUN BLDV-MCNC: 12 MG/DL (ref 7–20)
CALCIUM SERPL-MCNC: 8.3 MG/DL (ref 8.3–10.6)
CHLORIDE BLD-SCNC: 102 MMOL/L (ref 99–110)
CO2: 25 MMOL/L (ref 21–32)
CREAT SERPL-MCNC: 0.7 MG/DL (ref 0.9–1.3)
EOSINOPHILS ABSOLUTE: 0 K/UL (ref 0–0.6)
EOSINOPHILS RELATIVE PERCENT: 0 %
GFR AFRICAN AMERICAN: >60
GFR NON-AFRICAN AMERICAN: >60
GLUCOSE BLD-MCNC: 143 MG/DL (ref 70–99)
HCT VFR BLD CALC: 32.6 % (ref 40.5–52.5)
HEMOGLOBIN: 10.5 G/DL (ref 13.5–17.5)
LYMPHOCYTES ABSOLUTE: 0.6 K/UL (ref 1–5.1)
LYMPHOCYTES RELATIVE PERCENT: 4.5 %
MCH RBC QN AUTO: 31.3 PG (ref 26–34)
MCHC RBC AUTO-ENTMCNC: 32.3 G/DL (ref 31–36)
MCV RBC AUTO: 96.9 FL (ref 80–100)
MONOCYTES ABSOLUTE: 1 K/UL (ref 0–1.3)
MONOCYTES RELATIVE PERCENT: 7.3 %
NEUTROPHILS ABSOLUTE: 11.9 K/UL (ref 1.7–7.7)
NEUTROPHILS RELATIVE PERCENT: 87.9 %
PDW BLD-RTO: 13.7 % (ref 12.4–15.4)
PHOSPHORUS: 3.9 MG/DL (ref 2.5–4.9)
PLATELET # BLD: 423 K/UL (ref 135–450)
PMV BLD AUTO: 7.3 FL (ref 5–10.5)
POTASSIUM SERPL-SCNC: 4.1 MMOL/L (ref 3.5–5.1)
RBC # BLD: 3.36 M/UL (ref 4.2–5.9)
SODIUM BLD-SCNC: 137 MMOL/L (ref 136–145)
TSH REFLEX: 0.81 UIU/ML (ref 0.27–4.2)
WBC # BLD: 13.5 K/UL (ref 4–11)

## 2020-01-25 PROCEDURE — 1200000000 HC SEMI PRIVATE

## 2020-01-25 PROCEDURE — 80069 RENAL FUNCTION PANEL: CPT

## 2020-01-25 PROCEDURE — 6370000000 HC RX 637 (ALT 250 FOR IP): Performed by: HOSPITALIST

## 2020-01-25 PROCEDURE — 6360000002 HC RX W HCPCS: Performed by: HOSPITALIST

## 2020-01-25 PROCEDURE — 2580000003 HC RX 258: Performed by: HOSPITALIST

## 2020-01-25 PROCEDURE — 84443 ASSAY THYROID STIM HORMONE: CPT

## 2020-01-25 PROCEDURE — 94640 AIRWAY INHALATION TREATMENT: CPT

## 2020-01-25 PROCEDURE — 85025 COMPLETE CBC W/AUTO DIFF WBC: CPT

## 2020-01-25 PROCEDURE — 94761 N-INVAS EAR/PLS OXIMETRY MLT: CPT

## 2020-01-25 PROCEDURE — 36415 COLL VENOUS BLD VENIPUNCTURE: CPT

## 2020-01-25 RX ADMIN — IPRATROPIUM BROMIDE AND ALBUTEROL SULFATE 1 AMPULE: .5; 3 SOLUTION RESPIRATORY (INHALATION) at 07:12

## 2020-01-25 RX ADMIN — Medication 2 PUFF: at 07:12

## 2020-01-25 RX ADMIN — GUAIFENESIN AND DEXTROMETHORPHAN 5 ML: 100; 10 SYRUP ORAL at 22:47

## 2020-01-25 RX ADMIN — GUAIFENESIN AND DEXTROMETHORPHAN 5 ML: 100; 10 SYRUP ORAL at 13:53

## 2020-01-25 RX ADMIN — GUAIFENESIN AND DEXTROMETHORPHAN 5 ML: 100; 10 SYRUP ORAL at 02:40

## 2020-01-25 RX ADMIN — Medication 10 ML: at 13:54

## 2020-01-25 RX ADMIN — SERTRALINE 50 MG: 50 TABLET, FILM COATED ORAL at 09:08

## 2020-01-25 RX ADMIN — IPRATROPIUM BROMIDE AND ALBUTEROL SULFATE 1 AMPULE: .5; 3 SOLUTION RESPIRATORY (INHALATION) at 16:45

## 2020-01-25 RX ADMIN — GUAIFENESIN AND CODEINE PHOSPHATE 5 ML: 100; 10 SOLUTION ORAL at 05:22

## 2020-01-25 RX ADMIN — CEFEPIME HYDROCHLORIDE 2 G: 2 INJECTION, POWDER, FOR SOLUTION INTRAVENOUS at 13:53

## 2020-01-25 RX ADMIN — IPRATROPIUM BROMIDE AND ALBUTEROL SULFATE 1 AMPULE: .5; 3 SOLUTION RESPIRATORY (INHALATION) at 11:09

## 2020-01-25 RX ADMIN — GUAIFENESIN AND CODEINE PHOSPHATE 5 ML: 100; 10 SOLUTION ORAL at 09:08

## 2020-01-25 RX ADMIN — ENOXAPARIN SODIUM 40 MG: 40 INJECTION SUBCUTANEOUS at 21:29

## 2020-01-25 RX ADMIN — ALPRAZOLAM 0.5 MG: 0.5 TABLET ORAL at 02:37

## 2020-01-25 RX ADMIN — Medication 10 ML: at 21:30

## 2020-01-25 RX ADMIN — AMLODIPINE BESYLATE 5 MG: 5 TABLET ORAL at 09:08

## 2020-01-25 RX ADMIN — IPRATROPIUM BROMIDE AND ALBUTEROL SULFATE 1 AMPULE: .5; 3 SOLUTION RESPIRATORY (INHALATION) at 20:54

## 2020-01-25 RX ADMIN — ALPRAZOLAM 0.5 MG: 0.5 TABLET ORAL at 13:55

## 2020-01-25 RX ADMIN — CEFEPIME HYDROCHLORIDE 2 G: 2 INJECTION, POWDER, FOR SOLUTION INTRAVENOUS at 22:47

## 2020-01-25 RX ADMIN — PANTOPRAZOLE SODIUM 40 MG: 40 TABLET, DELAYED RELEASE ORAL at 05:23

## 2020-01-25 ASSESSMENT — PAIN SCALES - GENERAL
PAINLEVEL_OUTOF10: 0
PAINLEVEL_OUTOF10: 7
PAINLEVEL_OUTOF10: 0

## 2020-01-25 ASSESSMENT — PAIN DESCRIPTION - LOCATION: LOCATION: RIB CAGE

## 2020-01-25 ASSESSMENT — PAIN DESCRIPTION - ORIENTATION: ORIENTATION: RIGHT

## 2020-01-25 ASSESSMENT — PAIN DESCRIPTION - PAIN TYPE: TYPE: ACUTE PAIN

## 2020-01-25 NOTE — PROGRESS NOTES
Assessment complete. See flow sheet. Patient resting in bed quietly at this time. No complaints of pain or discomfort voiced at this time. LCTA. Occasional dry non-productive cough noted. Respirations easy and even. Denies needs at this time. The care plan and education has been reviewed and mutually agreed upon with the patient. Will monitor.

## 2020-01-25 NOTE — PROGRESS NOTES
Patient resting in bed quietly at this time with eyes closed. No s/s of pain or discomfort noted at this time. Will monitor.

## 2020-01-26 ENCOUNTER — APPOINTMENT (OUTPATIENT)
Dept: CT IMAGING | Age: 50
DRG: 720 | End: 2020-01-26
Payer: MEDICAID

## 2020-01-26 LAB
BILIRUBIN URINE: NEGATIVE
BLOOD, URINE: NEGATIVE
CLARITY: CLEAR
COLOR: YELLOW
GLUCOSE URINE: NEGATIVE MG/DL
KETONES, URINE: NEGATIVE MG/DL
L. PNEUMOPHILA SEROGP 1 UR AG: NORMAL
LEUKOCYTE ESTERASE, URINE: NEGATIVE
MICROSCOPIC EXAMINATION: NORMAL
NITRITE, URINE: NEGATIVE
PH UA: 5.5 (ref 5–8)
PROTEIN UA: NEGATIVE MG/DL
SEDIMENTATION RATE, ERYTHROCYTE: 76 MM/HR (ref 0–15)
SPECIFIC GRAVITY UA: >1.03 (ref 1–1.03)
STREP PNEUMONIAE ANTIGEN, URINE: NORMAL
URINE TYPE: NORMAL
UROBILINOGEN, URINE: 0.2 E.U./DL

## 2020-01-26 PROCEDURE — 6360000002 HC RX W HCPCS: Performed by: INTERNAL MEDICINE

## 2020-01-26 PROCEDURE — 86005 ALLG SPEC IGE MULTIALLG SCR: CPT

## 2020-01-26 PROCEDURE — 6360000002 HC RX W HCPCS: Performed by: HOSPITALIST

## 2020-01-26 PROCEDURE — 86038 ANTINUCLEAR ANTIBODIES: CPT

## 2020-01-26 PROCEDURE — 6370000000 HC RX 637 (ALT 250 FOR IP): Performed by: HOSPITALIST

## 2020-01-26 PROCEDURE — 6360000004 HC RX CONTRAST MEDICATION: Performed by: NURSE PRACTITIONER

## 2020-01-26 PROCEDURE — 94760 N-INVAS EAR/PLS OXIMETRY 1: CPT

## 2020-01-26 PROCEDURE — 94761 N-INVAS EAR/PLS OXIMETRY MLT: CPT

## 2020-01-26 PROCEDURE — 85652 RBC SED RATE AUTOMATED: CPT

## 2020-01-26 PROCEDURE — 6360000002 HC RX W HCPCS: Performed by: NURSE PRACTITIONER

## 2020-01-26 PROCEDURE — 86331 IMMUNODIFFUSION OUCHTERLONY: CPT

## 2020-01-26 PROCEDURE — 99254 IP/OBS CNSLTJ NEW/EST MOD 60: CPT | Performed by: INTERNAL MEDICINE

## 2020-01-26 PROCEDURE — 86200 CCP ANTIBODY: CPT

## 2020-01-26 PROCEDURE — 71260 CT THORAX DX C+: CPT

## 2020-01-26 PROCEDURE — 86606 ASPERGILLUS ANTIBODY: CPT

## 2020-01-26 PROCEDURE — 94150 VITAL CAPACITY TEST: CPT

## 2020-01-26 PROCEDURE — 2700000000 HC OXYGEN THERAPY PER DAY

## 2020-01-26 PROCEDURE — 86255 FLUORESCENT ANTIBODY SCREEN: CPT

## 2020-01-26 PROCEDURE — 2580000003 HC RX 258: Performed by: HOSPITALIST

## 2020-01-26 PROCEDURE — 81003 URINALYSIS AUTO W/O SCOPE: CPT

## 2020-01-26 PROCEDURE — 1200000000 HC SEMI PRIVATE

## 2020-01-26 PROCEDURE — 86235 NUCLEAR ANTIGEN ANTIBODY: CPT

## 2020-01-26 PROCEDURE — 2580000003 HC RX 258: Performed by: NURSE PRACTITIONER

## 2020-01-26 PROCEDURE — 87449 NOS EACH ORGANISM AG IA: CPT

## 2020-01-26 PROCEDURE — 94640 AIRWAY INHALATION TREATMENT: CPT

## 2020-01-26 PROCEDURE — 6370000000 HC RX 637 (ALT 250 FOR IP): Performed by: NURSE PRACTITIONER

## 2020-01-26 PROCEDURE — 36415 COLL VENOUS BLD VENIPUNCTURE: CPT

## 2020-01-26 PROCEDURE — 86431 RHEUMATOID FACTOR QUANT: CPT

## 2020-01-26 PROCEDURE — 86003 ALLG SPEC IGE CRUDE XTRC EA: CPT

## 2020-01-26 RX ORDER — ALPRAZOLAM 0.5 MG/1
0.5 TABLET ORAL 3 TIMES DAILY PRN
Status: DISCONTINUED | OUTPATIENT
Start: 2020-01-26 | End: 2020-01-27

## 2020-01-26 RX ORDER — VANCOMYCIN HYDROCHLORIDE 1 G/200ML
1000 INJECTION, SOLUTION INTRAVENOUS EVERY 12 HOURS
Status: DISCONTINUED | OUTPATIENT
Start: 2020-01-26 | End: 2020-01-26

## 2020-01-26 RX ORDER — FUROSEMIDE 10 MG/ML
20 INJECTION INTRAMUSCULAR; INTRAVENOUS ONCE
Status: COMPLETED | OUTPATIENT
Start: 2020-01-26 | End: 2020-01-26

## 2020-01-26 RX ORDER — FUROSEMIDE 10 MG/ML
40 INJECTION INTRAMUSCULAR; INTRAVENOUS DAILY
Status: DISCONTINUED | OUTPATIENT
Start: 2020-01-27 | End: 2020-01-27

## 2020-01-26 RX ORDER — LEVALBUTEROL INHALATION SOLUTION 1.25 MG/3ML
1.25 SOLUTION RESPIRATORY (INHALATION) EVERY 8 HOURS PRN
Status: DISCONTINUED | OUTPATIENT
Start: 2020-01-26 | End: 2020-01-30 | Stop reason: HOSPADM

## 2020-01-26 RX ORDER — IBUPROFEN 600 MG/1
600 TABLET ORAL EVERY 6 HOURS PRN
Status: DISCONTINUED | OUTPATIENT
Start: 2020-01-26 | End: 2020-01-30 | Stop reason: HOSPADM

## 2020-01-26 RX ORDER — METHYLPREDNISOLONE SODIUM SUCCINATE 40 MG/ML
40 INJECTION, POWDER, LYOPHILIZED, FOR SOLUTION INTRAMUSCULAR; INTRAVENOUS DAILY
Status: DISCONTINUED | OUTPATIENT
Start: 2020-01-26 | End: 2020-01-30 | Stop reason: HOSPADM

## 2020-01-26 RX ORDER — DEXTROMETHORPHAN HYDROBROMIDE AND PROMETHAZINE HYDROCHLORIDE 15; 6.25 MG/5ML; MG/5ML
5 SYRUP ORAL EVERY 4 HOURS PRN
Status: DISCONTINUED | OUTPATIENT
Start: 2020-01-26 | End: 2020-01-30 | Stop reason: HOSPADM

## 2020-01-26 RX ADMIN — GUAIFENESIN AND DEXTROMETHORPHAN 5 ML: 100; 10 SYRUP ORAL at 03:22

## 2020-01-26 RX ADMIN — ALPRAZOLAM 0.5 MG: 0.5 TABLET ORAL at 11:09

## 2020-01-26 RX ADMIN — SERTRALINE 50 MG: 50 TABLET, FILM COATED ORAL at 09:50

## 2020-01-26 RX ADMIN — IBUPROFEN 600 MG: 600 TABLET, FILM COATED ORAL at 22:17

## 2020-01-26 RX ADMIN — PROMETHAZINE HYDROCHLORIDE AND DEXTROMETHORPHAN HYDROBROMIDE 5 ML: 15; 6.25 SYRUP ORAL at 22:17

## 2020-01-26 RX ADMIN — Medication 2 PUFF: at 08:19

## 2020-01-26 RX ADMIN — GUAIFENESIN AND DEXTROMETHORPHAN 5 ML: 100; 10 SYRUP ORAL at 09:57

## 2020-01-26 RX ADMIN — IPRATROPIUM BROMIDE AND ALBUTEROL SULFATE 1 AMPULE: .5; 3 SOLUTION RESPIRATORY (INHALATION) at 15:58

## 2020-01-26 RX ADMIN — PROMETHAZINE HYDROCHLORIDE AND DEXTROMETHORPHAN HYDROBROMIDE 5 ML: 15; 6.25 SYRUP ORAL at 12:15

## 2020-01-26 RX ADMIN — AMLODIPINE BESYLATE 5 MG: 5 TABLET ORAL at 09:50

## 2020-01-26 RX ADMIN — Medication 10 ML: at 21:55

## 2020-01-26 RX ADMIN — IOPAMIDOL 100 ML: 755 INJECTION, SOLUTION INTRAVENOUS at 08:52

## 2020-01-26 RX ADMIN — ALPRAZOLAM 0.5 MG: 0.5 TABLET ORAL at 22:17

## 2020-01-26 RX ADMIN — ENOXAPARIN SODIUM 40 MG: 40 INJECTION SUBCUTANEOUS at 21:54

## 2020-01-26 RX ADMIN — PANTOPRAZOLE SODIUM 40 MG: 40 TABLET, DELAYED RELEASE ORAL at 05:53

## 2020-01-26 RX ADMIN — GUAIFENESIN AND CODEINE PHOSPHATE 5 ML: 100; 10 SOLUTION ORAL at 05:53

## 2020-01-26 RX ADMIN — LEVALBUTEROL HYDROCHLORIDE 1.25 MG: 1.25 SOLUTION RESPIRATORY (INHALATION) at 12:45

## 2020-01-26 RX ADMIN — Medication 2 PUFF: at 20:45

## 2020-01-26 RX ADMIN — CEFEPIME HYDROCHLORIDE 2 G: 2 INJECTION, POWDER, FOR SOLUTION INTRAVENOUS at 23:33

## 2020-01-26 RX ADMIN — ALPRAZOLAM 0.5 MG: 0.5 TABLET ORAL at 03:22

## 2020-01-26 RX ADMIN — IPRATROPIUM BROMIDE AND ALBUTEROL SULFATE 1 AMPULE: .5; 3 SOLUTION RESPIRATORY (INHALATION) at 08:19

## 2020-01-26 RX ADMIN — METHYLPREDNISOLONE SODIUM SUCCINATE 40 MG: 40 INJECTION, POWDER, FOR SOLUTION INTRAMUSCULAR; INTRAVENOUS at 14:54

## 2020-01-26 RX ADMIN — VANCOMYCIN HYDROCHLORIDE 1250 MG: 10 INJECTION, POWDER, LYOPHILIZED, FOR SOLUTION INTRAVENOUS at 12:15

## 2020-01-26 RX ADMIN — CEFEPIME HYDROCHLORIDE 2 G: 2 INJECTION, POWDER, FOR SOLUTION INTRAVENOUS at 11:09

## 2020-01-26 RX ADMIN — FUROSEMIDE 20 MG: 10 INJECTION, SOLUTION INTRAMUSCULAR; INTRAVENOUS at 11:09

## 2020-01-26 RX ADMIN — GUAIFENESIN AND CODEINE PHOSPHATE 5 ML: 100; 10 SOLUTION ORAL at 00:40

## 2020-01-26 RX ADMIN — IPRATROPIUM BROMIDE AND ALBUTEROL SULFATE 1 AMPULE: .5; 3 SOLUTION RESPIRATORY (INHALATION) at 20:42

## 2020-01-26 ASSESSMENT — PAIN DESCRIPTION - PAIN TYPE: TYPE: ACUTE PAIN

## 2020-01-26 ASSESSMENT — PAIN SCALES - GENERAL
PAINLEVEL_OUTOF10: 0
PAINLEVEL_OUTOF10: 7

## 2020-01-26 ASSESSMENT — PAIN DESCRIPTION - LOCATION: LOCATION: RIB CAGE

## 2020-01-26 ASSESSMENT — PAIN DESCRIPTION - ORIENTATION: ORIENTATION: RIGHT;LEFT

## 2020-01-26 NOTE — PROGRESS NOTES
[facility] PROGRESS NOTE    1/26/2020 8:27 AM        Name: Moe Osorio . Admitted: 1/24/2020  Primary Care Provider: Gui Shi MD (Tel: [phone])      Subjective:  . Seen this am  HR consistently at 110 - 120   CTPA was negative on 1/16,    I repeated this am and shows worsening PNA.  NO PE     TSH in range   Clinically he looks a little wet  Now with persistent dry cough   Echo ordered Saturday but not completed   Reports rib pain from coughing,  Now on 1 liter oxygen       Reviewed interval ancillary notes    Current Medications  perflutren lipid microspheres (DEFINITY) injection 1.65 mg, ONCE PRN  albuterol sulfate  (90 Base) MCG/ACT inhaler 2 puff, 4x Daily PRN  ALPRAZolam (XANAX) tablet 0.5 mg, BID PRN  amLODIPine (NORVASC) tablet 5 mg, Daily  mometasone-formoterol (DULERA) 100-5 MCG/ACT inhaler 2 puff, BID  pantoprazole (PROTONIX) tablet 40 mg, QAM AC  sertraline (ZOLOFT) tablet 50 mg, Daily  sodium chloride flush 0.9 % injection 10 mL, 2 times per day  sodium chloride flush 0.9 % injection 10 mL, PRN  magnesium hydroxide (MILK OF MAGNESIA) 400 MG/5ML suspension 30 mL, Daily PRN  ondansetron (ZOFRAN) injection 4 mg, Q6H PRN  enoxaparin (LOVENOX) injection 40 mg, Nightly  cefepime (MAXIPIME) 2 g IVPB minibag, Q12H  guaiFENesin-dextromethorphan (ROBITUSSIN DM) 100-10 MG/5ML syrup 5 mL, Q4H PRN  guaiFENesin-codeine (GUAIFENESIN AC) 100-10 MG/5ML liquid 5 mL, Q4H PRN  ipratropium-albuterol (DUONEB) nebulizer solution 1 ampule, Q4H WA        Objective:  /69   Pulse 121   Temp 98.1 °F (36.7 °C) (Oral)   Resp 18   Ht 5' 10\" (1.778 m)   Wt 176 lb 9.6 oz (80.1 kg)   SpO2 98%   BMI 25.34 kg/m²   No intake or output data in the 24 hours ending 01/26/20 0827   Wt Readings from Last 3 Encounters:   01/24/20 176 lb 9.6 oz (80.1 kg)   01/16/20 173 lb 8 oz (78.7 kg)   01/15/20 175 lb (79.4

## 2020-01-26 NOTE — CONSULTS
[facility]   Consult Note      Reason for Consult: Community-acquired pneumonia poorly responsive to antibiotic therapy  Requesting Physician: Pura Pérez    Subjective:   CHIEF COMPLAINT: Dry cough and shortness of breath     HPI: Patient states that he has had a 2-week history of worsening cough and shortness of breath. Was originally admitted to [facility]. between 1/15 and 1/18 for \"pneumonia\", treated with Rocephin/Zithromax-discharged on a course of doxycycline and Ceftin. States that his symptoms continue to get worse-unable to bring up any phlegm, no hemoptysis noted. Has noticed right-sided chest pain particularly with the cough, thinks that it might be muscular. No fevers or chills. Has developed lower extremity edema and a macular rash over lower extremities. Presented to us again 2 days ago, pulmonary consultation requested for worsening infiltrates noted on CT. History of asthma-on Dulera and albuterol inhalers. No exposure to dust or chemicals. No pets at home. Denies any arthritis symptoms, rash as described above with no skin thickening or tightness. Denies any dysphagia, does carry a diagnosis of GERD. The patient is a 51 y.o. male with significant past medical history of:      Diagnosis Date    Anxiety     Asthma     Bipolar affective disorder ([facility] [address] 6/4/2010    Depression 6/4/2010    GERD (gastroesophageal reflux disease) 4/28/2010    Hypertension     Impotence 3/31/2010    Insomnia         Past Surgical History:    History reviewed. No pertinent surgical history.   Current Medications:    Current Facility-Administered Medications: ibuprofen (ADVIL;MOTRIN) tablet 600 mg, 600 mg, Oral, Q6H PRN  ALPRAZolam (XANAX) tablet 0.5 mg, 0.5 mg, Oral, TID PRN  promethazine-dextromethorphan (PROMETHAZINE-DM) 6.25-15 MG/5ML syrup 5 mL, 5 mL, Oral, Q4H PRN  vancomycin (VANCOCIN) 1,250 mg in dextrose 5 % 250 mL IVPB, 15 mg/kg, Intravenous, Q12H  levalbuterol (XOPENEX) nebulizer solution 1.25 mg, 1.25 mg, Nebulization, Q8H PRN  perflutren lipid microspheres (DEFINITY) injection 1.65 mg, 1.5 mL, Intravenous, ONCE PRN  albuterol sulfate  (90 Base) MCG/ACT inhaler 2 puff, 2 puff, Inhalation, 4x Daily PRN  amLODIPine (NORVASC) tablet 5 mg, 5 mg, Oral, Daily  mometasone-formoterol (DULERA) 100-5 MCG/ACT inhaler 2 puff, 2 puff, Inhalation, BID  pantoprazole (PROTONIX) tablet 40 mg, 40 mg, Oral, QAM AC  sertraline (ZOLOFT) tablet 50 mg, 50 mg, Oral, Daily  sodium chloride flush 0.9 % injection 10 mL, 10 mL, Intravenous, 2 times per day  sodium chloride flush 0.9 % injection 10 mL, 10 mL, Intravenous, PRN  magnesium hydroxide (MILK OF MAGNESIA) 400 MG/5ML suspension 30 mL, 30 mL, Oral, Daily PRN  ondansetron (ZOFRAN) injection 4 mg, 4 mg, Intravenous, Q6H PRN  enoxaparin (LOVENOX) injection 40 mg, 40 mg, Subcutaneous, Nightly  cefepime (MAXIPIME) 2 g IVPB minibag, 2 g, Intravenous, Q12H  guaiFENesin-dextromethorphan (ROBITUSSIN DM) 100-10 MG/5ML syrup 5 mL, 5 mL, Oral, Q4H PRN  ipratropium-albuterol (DUONEB) nebulizer solution 1 ampule, 1 ampule, Inhalation, Q4H WA    Allergies   Allergen Reactions    Vicodin [Hydrocodone-Acetaminophen] Hives       Social History:    TOBACCO:   reports that he has been smoking. He has never used smokeless tobacco.  ETOH:   reports current alcohol use. Patient currently lives independently    Family History:   History reviewed. No pertinent family history.     REVIEW OF SYSTEMS:    Constitutional: negative for fatigue, fevers, malaise and weight loss  Ears, nose, mouth, throat: negative for ear drainage, epistaxis, hoarseness, nasal congestion, sore throat and voice change  Respiratory: negative except for cough and shortness of breath  Cardiovascular: negative for chest pain, chest pressure/discomfort, irregular heart beat, lower extremity edema and palpitations  Gastrointestinal: negative for abdominal pain, of motion, no joint swelling, deformity or tenderness  Neurologic: alert, no focal neurologic deficits    Data Review:  CBC:   Lab Results   Component Value Date    WBC 13.5 01/25/2020    RBC 3.36 01/25/2020     BMP:   Lab Results   Component Value Date    GLUCOSE 143 01/25/2020    CO2 25 01/25/2020    BUN 12 01/25/2020    CREATININE 0.7 01/25/2020    CALCIUM 8.3 01/25/2020     ABG: No results found for: OTW1OBM, BEART, P9SKCKUY, PHART, THGBART, NFG4QMO, PO2ART, YGL7IOH    Radiology: All pertinent images / reports were reviewed as a part of this visit. EXAMINATION:   CTA OF THE CHEST 1/26/2020 8:51 am       TECHNIQUE:   CTA of the chest was performed after the administration of intravenous   contrast.  Multiplanar reformatted images are provided for review.  MIP   images are provided for review. Dose modulation, iterative reconstruction,   and/or weight based adjustment of the mA/kV was utilized to reduce the   radiation dose to as low as reasonably achievable.       COMPARISON:   01/16/2020 [facility]       HISTORY:   ORDERING SYSTEM PROVIDED HISTORY: persistent tachycardia ,  r/o PE   TECHNOLOGIST PROVIDED HISTORY:   Reason for exam:->persistent tachycardia ,  r/o PE   Reason for Exam: persistent tachycardia ,  r/o PE   Acuity: Acute   Type of Exam: Initial       FINDINGS:   Pulmonary Arteries: Pulmonary arteries are adequately opacified for   evaluation.  No evidence of intraluminal filling defect to suggest pulmonary   embolism.  Main pulmonary artery is normal in caliber.       Mediastinum: Borderline cardiomegaly.  Some calcified mediastinal nodes.  No   significant lymphadenopathy.  Normal caliber aorta.  Thyroid gland and   esophagus grossly normal.       Lungs/pleura:  Moderate right pleural effusion unchanged.  Trace left pleural   effusion showing significant improvement from prior.  Left posterior basal   subsegmental atelectasis on prior has resolved.       Redemonstration of patchy

## 2020-01-26 NOTE — PROGRESS NOTES
Assessment complete. VSS. Patient resting in bed. Pt complain of SOB placed on 1L O2 for comfort MD paged. Call light in reach. Fall precautions in place. No needs expressed at this time. Will continue to monitor.

## 2020-01-26 NOTE — CONSULTS
Pharmacy Note  Vancomycin Consult    Antwan Prado is a 51 y.o. male started on Vancomycin for PNA; consult received from [address] [facility] to manage therapy. Also receiving the following antibiotics: Cefepime. Patient Active Problem List   Diagnosis    Anxiety    Asthma    Bipolar affective disorder [facility])    Depression    Erectile dysfunction    Pneumonia       Allergies:  Vicodin [hydrocodone-acetaminophen]       Recent Labs     01/24/20  1222 01/25/20  0839   BUN 11 12       Recent Labs     01/24/20  1222 01/25/20  0839   CREATININE 0.6* 0.7*       Recent Labs     01/24/20  1222 01/25/20  0839   WBC 14.6* 13.5*       No intake or output data in the 24 hours ending 01/26/20 1055      Ht Readings from Last 1 Encounters:   01/24/20 5' 10\" (1.778 m)        Wt Readings from Last 1 Encounters:   01/24/20 176 lb 9.6 oz (80.1 kg)         Body mass index is 25.34 kg/m². Estimated Creatinine Clearance: 133 mL/min (A) (based on SCr of 0.7 mg/dL (L)). Goal Trough Level: 15-20 mcg/mL    Assessment/Plan:  Will initiate vancomycin 1250 mg (15mg/kg) IV every 12 hours. Vanc trough ordered prior to the 4th dose 1/27/20 @2230. Pharmacy will continue to follow culture results, renal function, and clinical response. Thank you for the consult.       Barry Dias, PharmD  PGY1 Pharmacy Resident  [id number]

## 2020-01-27 ENCOUNTER — ANESTHESIA (OUTPATIENT)
Dept: ENDOSCOPY | Age: 50
DRG: 720 | End: 2020-01-27
Payer: MEDICAID

## 2020-01-27 ENCOUNTER — ANESTHESIA EVENT (OUTPATIENT)
Dept: ENDOSCOPY | Age: 50
DRG: 720 | End: 2020-01-27
Payer: MEDICAID

## 2020-01-27 VITALS — DIASTOLIC BLOOD PRESSURE: 69 MMHG | OXYGEN SATURATION: 97 % | SYSTOLIC BLOOD PRESSURE: 107 MMHG

## 2020-01-27 LAB
APPEARANCE BAL (LAVAGE): ABNORMAL
APPEARANCE BAL (LAVAGE): ABNORMAL
APPEARANCE BAL (LAVAGE): CLEAR
CLOT EVALUATION BAL: ABNORMAL
COLOR LAVAGE: COLORLESS
EOSIN: 1 %
EOSIN: 2 %
EPITHELIAL CELLS FLUID: 13 %
LV EF: 20 %
LVEF MODALITY: NORMAL
LYMPHOCYTES, BAL: 12 % (ref 5–10)
LYMPHOCYTES, BAL: 17 % (ref 5–10)
LYMPHOCYTES, BAL: 8 % (ref 5–10)
MACROPHAGES, BAL: 29 % (ref 90–95)
MACROPHAGES, BAL: 65 % (ref 90–95)
MACROPHAGES, BAL: 83 % (ref 90–95)
MONOCYTES, BAL: 3 %
MONOCYTES, BAL: 4 %
MONONUCLEAR UNIDENTIFIED CELLS FLUID BAL: 2 %
NUMBER OF CELLS COUNTED BAL (LAVAGE): 100
RBC, BAL: 1100 /CUMM
RBC, BAL: 1600 /CUMM
RBC, BAL: 2600 /CUMM
RHEUMATOID FACTOR: <10 IU/ML
SEGMENTED NEUTROPHILS, BAL: 19 % (ref 5–10)
SEGMENTED NEUTROPHILS, BAL: 33 % (ref 5–10)
SEGMENTED NEUTROPHILS, BAL: 9 % (ref 5–10)
VOLUME LAVAGE: 1 ML
VOLUME LAVAGE: 2 ML
VOLUME LAVAGE: 3 ML
WBC/EPI CELLS BAL: 468 /CUMM
WBC/EPI CELLS BAL: 491 /CUMM
WBC/EPI CELLS BAL: 63 /CUMM

## 2020-01-27 PROCEDURE — 6360000002 HC RX W HCPCS: Performed by: INTERNAL MEDICINE

## 2020-01-27 PROCEDURE — 87102 FUNGUS ISOLATION CULTURE: CPT

## 2020-01-27 PROCEDURE — 99255 IP/OBS CONSLTJ NEW/EST HI 80: CPT | Performed by: INTERNAL MEDICINE

## 2020-01-27 PROCEDURE — 6360000002 HC RX W HCPCS: Performed by: HOSPITALIST

## 2020-01-27 PROCEDURE — 87070 CULTURE OTHR SPECIMN AEROBIC: CPT

## 2020-01-27 PROCEDURE — 2500000003 HC RX 250 WO HCPCS: Performed by: NURSE ANESTHETIST, CERTIFIED REGISTERED

## 2020-01-27 PROCEDURE — 87798 DETECT AGENT NOS DNA AMP: CPT

## 2020-01-27 PROCEDURE — 31623 DX BRONCHOSCOPE/BRUSH: CPT | Performed by: INTERNAL MEDICINE

## 2020-01-27 PROCEDURE — 3700000001 HC ADD 15 MINUTES (ANESTHESIA): Performed by: INTERNAL MEDICINE

## 2020-01-27 PROCEDURE — 7100000001 HC PACU RECOVERY - ADDTL 15 MIN: Performed by: INTERNAL MEDICINE

## 2020-01-27 PROCEDURE — 6360000002 HC RX W HCPCS: Performed by: NURSE ANESTHETIST, CERTIFIED REGISTERED

## 2020-01-27 PROCEDURE — 87449 NOS EACH ORGANISM AG IA: CPT

## 2020-01-27 PROCEDURE — 87281 PNEUMOCYSTIS CARINII AG IF: CPT

## 2020-01-27 PROCEDURE — 3609010800 HC BRONCHOSCOPY ALVEOLAR LAVAGE: Performed by: INTERNAL MEDICINE

## 2020-01-27 PROCEDURE — 87015 SPECIMEN INFECT AGNT CONCNTJ: CPT

## 2020-01-27 PROCEDURE — 6370000000 HC RX 637 (ALT 250 FOR IP): Performed by: HOSPITALIST

## 2020-01-27 PROCEDURE — 87206 SMEAR FLUORESCENT/ACID STAI: CPT

## 2020-01-27 PROCEDURE — 88112 CYTOPATH CELL ENHANCE TECH: CPT

## 2020-01-27 PROCEDURE — 2580000003 HC RX 258: Performed by: HOSPITALIST

## 2020-01-27 PROCEDURE — 2580000003 HC RX 258: Performed by: ANESTHESIOLOGY

## 2020-01-27 PROCEDURE — 94640 AIRWAY INHALATION TREATMENT: CPT

## 2020-01-27 PROCEDURE — 89051 BODY FLUID CELL COUNT: CPT

## 2020-01-27 PROCEDURE — 6370000000 HC RX 637 (ALT 250 FOR IP): Performed by: INTERNAL MEDICINE

## 2020-01-27 PROCEDURE — 88305 TISSUE EXAM BY PATHOLOGIST: CPT

## 2020-01-27 PROCEDURE — 1200000000 HC SEMI PRIVATE

## 2020-01-27 PROCEDURE — 2700000000 HC OXYGEN THERAPY PER DAY

## 2020-01-27 PROCEDURE — 3700000000 HC ANESTHESIA ATTENDED CARE: Performed by: INTERNAL MEDICINE

## 2020-01-27 PROCEDURE — 7100000000 HC PACU RECOVERY - FIRST 15 MIN: Performed by: INTERNAL MEDICINE

## 2020-01-27 PROCEDURE — 93306 TTE W/DOPPLER COMPLETE: CPT

## 2020-01-27 PROCEDURE — 87205 SMEAR GRAM STAIN: CPT

## 2020-01-27 PROCEDURE — 87581 M.PNEUMON DNA AMP PROBE: CPT

## 2020-01-27 PROCEDURE — 87631 RESP VIRUS 3-5 TARGETS: CPT

## 2020-01-27 PROCEDURE — 94761 N-INVAS EAR/PLS OXIMETRY MLT: CPT

## 2020-01-27 PROCEDURE — B2111ZZ FLUOROSCOPY OF MULTIPLE CORONARY ARTERIES USING LOW OSMOLAR CONTRAST: ICD-10-PCS | Performed by: INTERNAL MEDICINE

## 2020-01-27 PROCEDURE — 0BD58ZX EXTRACTION OF RIGHT MIDDLE LOBE BRONCHUS, VIA NATURAL OR ARTIFICIAL OPENING ENDOSCOPIC, DIAGNOSTIC: ICD-10-PCS | Performed by: INTERNAL MEDICINE

## 2020-01-27 PROCEDURE — 2580000003 HC RX 258: Performed by: NURSE ANESTHETIST, CERTIFIED REGISTERED

## 2020-01-27 PROCEDURE — 2709999900 HC NON-CHARGEABLE SUPPLY: Performed by: INTERNAL MEDICINE

## 2020-01-27 PROCEDURE — 87541 LEGION PNEUMO DNA AMP PROB: CPT

## 2020-01-27 PROCEDURE — 88104 CYTOPATH FL NONGYN SMEARS: CPT

## 2020-01-27 PROCEDURE — 3609011100 HC BRONCHOSCOPY BRUSHINGS: Performed by: INTERNAL MEDICINE

## 2020-01-27 PROCEDURE — 87486 CHLMYD PNEUM DNA AMP PROBE: CPT

## 2020-01-27 PROCEDURE — 0B9D8ZX DRAINAGE OF RIGHT MIDDLE LUNG LOBE, VIA NATURAL OR ARTIFICIAL OPENING ENDOSCOPIC, DIAGNOSTIC: ICD-10-PCS | Performed by: INTERNAL MEDICINE

## 2020-01-27 PROCEDURE — 6370000000 HC RX 637 (ALT 250 FOR IP): Performed by: PHYSICIAN ASSISTANT

## 2020-01-27 PROCEDURE — 2580000003 HC RX 258: Performed by: NURSE PRACTITIONER

## 2020-01-27 PROCEDURE — 87305 ASPERGILLUS AG IA: CPT

## 2020-01-27 PROCEDURE — 6360000002 HC RX W HCPCS: Performed by: NURSE PRACTITIONER

## 2020-01-27 PROCEDURE — 99232 SBSQ HOSP IP/OBS MODERATE 35: CPT | Performed by: INTERNAL MEDICINE

## 2020-01-27 PROCEDURE — B2151ZZ FLUOROSCOPY OF LEFT HEART USING LOW OSMOLAR CONTRAST: ICD-10-PCS | Performed by: INTERNAL MEDICINE

## 2020-01-27 PROCEDURE — 87116 MYCOBACTERIA CULTURE: CPT

## 2020-01-27 PROCEDURE — 4A023N7 MEASUREMENT OF CARDIAC SAMPLING AND PRESSURE, LEFT HEART, PERCUTANEOUS APPROACH: ICD-10-PCS | Performed by: INTERNAL MEDICINE

## 2020-01-27 PROCEDURE — 31624 DX BRONCHOSCOPE/LAVAGE: CPT | Performed by: INTERNAL MEDICINE

## 2020-01-27 RX ORDER — LIDOCAINE HYDROCHLORIDE 40 MG/ML
SOLUTION TOPICAL PRN
Status: DISCONTINUED | OUTPATIENT
Start: 2020-01-27 | End: 2020-01-27 | Stop reason: ALTCHOICE

## 2020-01-27 RX ORDER — PROPOFOL 10 MG/ML
INJECTION, EMULSION INTRAVENOUS CONTINUOUS PRN
Status: DISCONTINUED | OUTPATIENT
Start: 2020-01-27 | End: 2020-01-27 | Stop reason: SDUPTHER

## 2020-01-27 RX ORDER — LISINOPRIL 5 MG/1
5 TABLET ORAL DAILY
Status: DISCONTINUED | OUTPATIENT
Start: 2020-01-27 | End: 2020-01-28

## 2020-01-27 RX ORDER — SODIUM CHLORIDE 9 MG/ML
INJECTION, SOLUTION INTRAVENOUS CONTINUOUS
Status: DISCONTINUED | OUTPATIENT
Start: 2020-01-27 | End: 2020-01-27

## 2020-01-27 RX ORDER — ALPRAZOLAM 0.5 MG/1
0.5 TABLET ORAL 3 TIMES DAILY
Status: DISCONTINUED | OUTPATIENT
Start: 2020-01-27 | End: 2020-01-30 | Stop reason: HOSPADM

## 2020-01-27 RX ORDER — LIDOCAINE HYDROCHLORIDE 20 MG/ML
INJECTION, SOLUTION INFILTRATION; PERINEURAL PRN
Status: DISCONTINUED | OUTPATIENT
Start: 2020-01-27 | End: 2020-01-27 | Stop reason: SDUPTHER

## 2020-01-27 RX ORDER — POTASSIUM CHLORIDE 20 MEQ/1
40 TABLET, EXTENDED RELEASE ORAL PRN
Status: DISCONTINUED | OUTPATIENT
Start: 2020-01-27 | End: 2020-01-30 | Stop reason: HOSPADM

## 2020-01-27 RX ORDER — SODIUM CHLORIDE 9 MG/ML
INJECTION, SOLUTION INTRAVENOUS CONTINUOUS PRN
Status: DISCONTINUED | OUTPATIENT
Start: 2020-01-27 | End: 2020-01-27 | Stop reason: SDUPTHER

## 2020-01-27 RX ORDER — PROPOFOL 10 MG/ML
INJECTION, EMULSION INTRAVENOUS PRN
Status: DISCONTINUED | OUTPATIENT
Start: 2020-01-27 | End: 2020-01-27 | Stop reason: SDUPTHER

## 2020-01-27 RX ORDER — POTASSIUM CHLORIDE 7.45 MG/ML
10 INJECTION INTRAVENOUS PRN
Status: DISCONTINUED | OUTPATIENT
Start: 2020-01-27 | End: 2020-01-30 | Stop reason: HOSPADM

## 2020-01-27 RX ORDER — ALPRAZOLAM 0.5 MG/1
0.5 TABLET ORAL 3 TIMES DAILY
Status: DISCONTINUED | OUTPATIENT
Start: 2020-01-27 | End: 2020-01-27

## 2020-01-27 RX ORDER — FUROSEMIDE 10 MG/ML
40 INJECTION INTRAMUSCULAR; INTRAVENOUS 2 TIMES DAILY
Status: DISCONTINUED | OUTPATIENT
Start: 2020-01-27 | End: 2020-01-30 | Stop reason: HOSPADM

## 2020-01-27 RX ORDER — IPRATROPIUM BROMIDE AND ALBUTEROL SULFATE 2.5; .5 MG/3ML; MG/3ML
1 SOLUTION RESPIRATORY (INHALATION) ONCE
Status: COMPLETED | OUTPATIENT
Start: 2020-01-27 | End: 2020-01-27

## 2020-01-27 RX ADMIN — PANTOPRAZOLE SODIUM 40 MG: 40 TABLET, DELAYED RELEASE ORAL at 11:47

## 2020-01-27 RX ADMIN — SODIUM CHLORIDE: 9 INJECTION, SOLUTION INTRAVENOUS at 08:41

## 2020-01-27 RX ADMIN — PHENYLEPHRINE HYDROCHLORIDE 200 MCG: 10 INJECTION INTRAVENOUS at 09:08

## 2020-01-27 RX ADMIN — ALPRAZOLAM 0.5 MG: 0.5 TABLET ORAL at 21:14

## 2020-01-27 RX ADMIN — CEFEPIME HYDROCHLORIDE 2 G: 2 INJECTION, POWDER, FOR SOLUTION INTRAVENOUS at 22:34

## 2020-01-27 RX ADMIN — PHENYLEPHRINE HYDROCHLORIDE 200 MCG: 10 INJECTION INTRAVENOUS at 09:00

## 2020-01-27 RX ADMIN — VANCOMYCIN HYDROCHLORIDE 1250 MG: 10 INJECTION, POWDER, LYOPHILIZED, FOR SOLUTION INTRAVENOUS at 14:20

## 2020-01-27 RX ADMIN — IPRATROPIUM BROMIDE AND ALBUTEROL SULFATE 1 AMPULE: .5; 3 SOLUTION RESPIRATORY (INHALATION) at 15:16

## 2020-01-27 RX ADMIN — FUROSEMIDE 40 MG: 10 INJECTION, SOLUTION INTRAMUSCULAR; INTRAVENOUS at 11:48

## 2020-01-27 RX ADMIN — GUAIFENESIN AND DEXTROMETHORPHAN 5 ML: 100; 10 SYRUP ORAL at 11:51

## 2020-01-27 RX ADMIN — Medication 10 ML: at 22:35

## 2020-01-27 RX ADMIN — ENOXAPARIN SODIUM 40 MG: 40 INJECTION SUBCUTANEOUS at 21:14

## 2020-01-27 RX ADMIN — PROPOFOL 4 MG: 10 INJECTION, EMULSION INTRAVENOUS at 08:51

## 2020-01-27 RX ADMIN — SERTRALINE 50 MG: 50 TABLET, FILM COATED ORAL at 11:45

## 2020-01-27 RX ADMIN — METOPROLOL TARTRATE 25 MG: 25 TABLET, FILM COATED ORAL at 21:14

## 2020-01-27 RX ADMIN — LEVALBUTEROL HYDROCHLORIDE 1.25 MG: 1.25 SOLUTION RESPIRATORY (INHALATION) at 21:22

## 2020-01-27 RX ADMIN — IPRATROPIUM BROMIDE AND ALBUTEROL SULFATE 1 AMPULE: .5; 3 SOLUTION RESPIRATORY (INHALATION) at 09:49

## 2020-01-27 RX ADMIN — VANCOMYCIN HYDROCHLORIDE 1250 MG: 10 INJECTION, POWDER, LYOPHILIZED, FOR SOLUTION INTRAVENOUS at 23:13

## 2020-01-27 RX ADMIN — ALPRAZOLAM 0.5 MG: 0.5 TABLET ORAL at 11:51

## 2020-01-27 RX ADMIN — GUAIFENESIN AND DEXTROMETHORPHAN 5 ML: 100; 10 SYRUP ORAL at 22:35

## 2020-01-27 RX ADMIN — LISINOPRIL 5 MG: 5 TABLET ORAL at 18:10

## 2020-01-27 RX ADMIN — CEFEPIME HYDROCHLORIDE 2 G: 2 INJECTION, POWDER, FOR SOLUTION INTRAVENOUS at 11:47

## 2020-01-27 RX ADMIN — LIDOCAINE HYDROCHLORIDE 10 MG: 20 INJECTION, SOLUTION INFILTRATION; PERINEURAL at 08:49

## 2020-01-27 RX ADMIN — Medication 10 ML: at 11:47

## 2020-01-27 RX ADMIN — METHYLPREDNISOLONE SODIUM SUCCINATE 40 MG: 40 INJECTION, POWDER, FOR SOLUTION INTRAMUSCULAR; INTRAVENOUS at 11:45

## 2020-01-27 RX ADMIN — FUROSEMIDE 40 MG: 10 INJECTION, SOLUTION INTRAMUSCULAR; INTRAVENOUS at 18:10

## 2020-01-27 RX ADMIN — Medication 2 PUFF: at 11:52

## 2020-01-27 RX ADMIN — SODIUM CHLORIDE: 9 INJECTION, SOLUTION INTRAVENOUS at 08:09

## 2020-01-27 RX ADMIN — PHENYLEPHRINE HYDROCHLORIDE 200 MCG: 10 INJECTION INTRAVENOUS at 08:54

## 2020-01-27 RX ADMIN — METOPROLOL TARTRATE 25 MG: 25 TABLET, FILM COATED ORAL at 11:48

## 2020-01-27 RX ADMIN — IPRATROPIUM BROMIDE AND ALBUTEROL SULFATE 1 AMPULE: .5; 3 SOLUTION RESPIRATORY (INHALATION) at 11:43

## 2020-01-27 RX ADMIN — Medication 2 PUFF: at 21:23

## 2020-01-27 RX ADMIN — PROPOFOL 120 MCG/KG/MIN: 10 INJECTION, EMULSION INTRAVENOUS at 08:54

## 2020-01-27 RX ADMIN — VANCOMYCIN HYDROCHLORIDE 1250 MG: 10 INJECTION, POWDER, LYOPHILIZED, FOR SOLUTION INTRAVENOUS at 00:08

## 2020-01-27 ASSESSMENT — PAIN - FUNCTIONAL ASSESSMENT: PAIN_FUNCTIONAL_ASSESSMENT: 0-10

## 2020-01-27 ASSESSMENT — PAIN SCALES - GENERAL
PAINLEVEL_OUTOF10: 5
PAINLEVEL_OUTOF10: 0
PAINLEVEL_OUTOF10: 0

## 2020-01-27 ASSESSMENT — PAIN DESCRIPTION - LOCATION: LOCATION: RIB CAGE;STERNUM

## 2020-01-27 ASSESSMENT — PAIN DESCRIPTION - ORIENTATION: ORIENTATION: MID

## 2020-01-27 ASSESSMENT — PAIN DESCRIPTION - FREQUENCY: FREQUENCY: INTERMITTENT

## 2020-01-27 ASSESSMENT — PAIN DESCRIPTION - DESCRIPTORS: DESCRIPTORS: SORE

## 2020-01-27 NOTE — ANESTHESIA PRE PROCEDURE
(DUONEB) nebulizer solution 1 ampule  1 ampule Inhalation Q4H WA John Valdes MD   1 ampule at 01/26/20 2042       Allergies: Allergies   Allergen Reactions    Vicodin [Hydrocodone-Acetaminophen] Hives       Problem List:    Patient Active Problem List   Diagnosis Code    Anxiety F41.9    Asthma J45.909    [facility] F31.9    Depression F32.9    Erectile dysfunction N52.9    Pneumonia J18.9       Past Medical History:        Diagnosis Date    Anxiety     Asthma     Bipolar affective disorder ([facility] 6/4/2010    Depression 6/4/2010    GERD (gastroesophageal reflux disease) 4/28/2010    Hypertension     Impotence 3/31/2010    Insomnia        Past Surgical History:  History reviewed. No pertinent surgical history.     Social History:    Social History     Tobacco Use    Smoking status: Current Some Day Smoker    Smokeless tobacco: Never Used    Tobacco comment: socially   Substance Use Topics    Alcohol use: Yes     Comment: socially                                Ready to quit: Not Answered  Counseling given: Not Answered  Comment: socially      Vital Signs (Current):   Vitals:    01/26/20 2151 01/26/20 2332 01/27/20 0508 01/27/20 0755   BP: 112/70 107/73 118/84 103/89   Pulse: 118 113 (!) 220 107   Resp: 18 18 18 16   Temp: 98.2 °F (36.8 °C) 97.8 °F (36.6 °C) 97.7 °F (36.5 °C) 97 °F (36.1 °C)   TempSrc: Oral Oral Oral Temporal   SpO2: 94% 95% 94% 100%   Weight:   179 lb 7 oz (81.4 kg)    Height:                                                  BP Readings from Last 3 Encounters:   01/27/20 103/89   01/18/20 118/81   01/15/20 120/87       NPO Status: Time of last liquid consumption: 2300                        Time of last solid consumption: 2300                        Date of last liquid consumption: 01/26/20                        Date of last solid food consumption: 01/26/20    BMI:   Wt Readings from Last 3 Encounters:   01/27/20 179 lb 7 oz (81.4 kg)   01/16/20 173 lb 8

## 2020-01-27 NOTE — OP NOTE
was cloudy specimen. No evidence of pulmonary hemorrhage. Bronchial brush both micro and cyto passed through the RML and brush sampling was obtained. Endobronchial findings: edematous airways as below. No significant secretions noted. Trachea: Edematous mucosa  Caitlyn: Edematous mucosa  Right main bronchus: Edematous mucosa  Right upper lobe bronchus: Edematous mucosa  Right Middle lobe bronchus: Edematous mucosa  Right Lower lobe bronchus: Edematous mucosa  Left main bronchus: Edematous mucosa  Left upper lobe bronchus: Edematous mucosa  Left lower lobe bronchus: Edematous mucosa    The patient was taken to the Endoscopy Recovery area in satisfactory condition. Recommendation:    1. F/U on culture results  2. F/U on cytology results    Attestation: I performed the procedure.     Orvin Holter

## 2020-01-27 NOTE — PROGRESS NOTES
kg/m²     Intake/Output Summary (Last 24 hours) at 1/27/2020 1628  Last data filed at 1/27/2020 1021  Gross per 24 hour   Intake 800 ml   Output --   Net 800 ml      Wt Readings from Last 3 Encounters:   01/27/20 179 lb 7 oz (81.4 kg)   01/16/20 173 lb 8 oz (78.7 kg)   01/15/20 175 lb (79.4 kg)       General appearance:  Appears uncomfortable, he is coughing often, dry non productive   Eyes: Sclera clear. Pupils equal.  ENT: Moist oral mucosa. Trachea midline, no adenopathy. Cardiovascular: Regular rhythm, normal S1, S2. No murmur. + 1 edema in lower extremities  Respiratory: Not using accessory muscles. Crackles noted in bases,  Dry cough present  GI: Abdomen soft, no tenderness, not distended, normal bowel sounds  Musculoskeletal: No cyanosis in digits, neck supple  Neurology: CN 2-12 grossly intact. No speech or motor deficits  Psych: Normal affect. Alert and oriented in time, place and person  Skin: Warm, dry, normal turgor    Labs and Tests:  CBC:   Recent Labs     01/25/20  0839   WBC 13.5*   HGB 10.5*        BMP:    Recent Labs     01/25/20  0839      K 4.1      CO2 25   BUN 12   CREATININE 0.7*   GLUCOSE 143*     Hepatic:   No results for input(s): AST, ALT, ALB, BILITOT, ALKPHOS in the last 72 hours. CT 1/26/2020    1. No evidence for acute pulmonary embolism. 2. Interval progression of extensive multifocal pneumonia in the right upper  and middle lobe with new areas in the left upper and lower lobes indicating  worsening pneumonia. 3. Stable moderate right pleural effusion. 4. There is trace right pleural effusion significantly improved from prior. Subsegmental atelectasis posterior left lung base on prior also resolved. Doppler 1/24/2020   Negative for any DVT in both legs       Problem List  Active Problems:    Multifocal pneumonia    Acute respiratory failure with hypoxia ([address]  Resolved Problems:    * No resolved hospital problems. *       Assessment & Plan:   1.

## 2020-01-27 NOTE — PROGRESS NOTES
Pt arrived from Endo to PACU bay 6. Report received from Endo staff. Pt arouses easily to voice. 10L humidfier mask. Oral airway. ST, VSS. Will continue to monitor.

## 2020-01-27 NOTE — PLAN OF CARE
Problem: Discharge Planning:  Goal: Discharged to appropriate level of care  Description  Discharged to appropriate level of care  1/27/2020 1121 by Timo French RN  Outcome: Ongoing  1/27/2020 0425 by Lefty Simons RN  Outcome: Ongoing  Goal: Participates in care planning  Description  Participates in care planning  1/27/2020 1121 by Timo French RN  Outcome: Ongoing  1/27/2020 0425 by Lefty Simons RN  Outcome: Ongoing     Problem: Airway Clearance - Ineffective:  Goal: Clear lung sounds  Description  Clear lung sounds  1/27/2020 1121 by Timo French RN  Outcome: Ongoing  1/27/2020 0425 by Lefty Simons RN  Outcome: Ongoing  Goal: Ability to maintain a clear airway will improve  Description  Ability to maintain a clear airway will improve  1/27/2020 1121 by Timo French RN  Outcome: Ongoing  1/27/2020 0425 by Lefty Simons RN  Outcome: Ongoing     Problem: Gas Exchange - Impaired:  Goal: Levels of oxygenation will improve  Description  Levels of oxygenation will improve  1/27/2020 1121 by Timo French RN  Outcome: Ongoing  1/27/2020 0425 by Lefty Simons RN  Outcome: Ongoing     Problem: Tobacco Use:  Goal: Will participate in inpatient tobacco-use cessation counseling  Description  Will participate in inpatient tobacco-use cessation counseling  1/27/2020 1121 by Timo French RN  Outcome: Ongoing  1/27/2020 0425 by Lefty Simons RN  Outcome: Ongoing

## 2020-01-27 NOTE — PROGRESS NOTES
Bedside rounding completed with Chel Duong RN. Pt provided with gown to wear for procedure. Pt denies needs at this time. White board updated. Call light in hand and pt verbalizes correct use. All needs within reach. Will continue to monitor.  Electronically signed by Marin Cast RN on 1/27/2020 at 7:31 AM

## 2020-01-27 NOTE — PROGRESS NOTES
[facility] Pulmonary/CCM Progress note      Admit Date: 1/24/2020    Chief Complaint: Cough and shortness of breath    Subjective: Interval History: Continued cough and shortness of breath, requiring up to 2 L O2. Nonproductive cough. No fevers.     Scheduled Meds:   metoprolol tartrate  25 mg Oral BID    ALPRAZolam  0.5 mg Oral TID    vancomycin  15 mg/kg Intravenous Q12H    methylPREDNISolone  40 mg Intravenous Daily    furosemide  40 mg Intravenous Daily    mometasone-formoterol  2 puff Inhalation BID    pantoprazole  40 mg Oral QAM AC    sertraline  50 mg Oral Daily    sodium chloride flush  10 mL Intravenous 2 times per day    enoxaparin  40 mg Subcutaneous Nightly    cefepime  2 g Intravenous Q12H    ipratropium-albuterol  1 ampule Inhalation Q4H WA     Continuous Infusions:  PRN Meds:ibuprofen, promethazine-dextromethorphan, levalbuterol, perflutren lipid microspheres, albuterol sulfate HFA, sodium chloride flush, magnesium hydroxide, ondansetron, guaiFENesin-dextromethorphan    Review of Systems  Constitutional: negative for fatigue, fevers, malaise and weight loss  Ears, nose, mouth, throat: negative for ear drainage, epistaxis, hoarseness, nasal congestion, sore throat and voice change  Respiratory: negative except for cough and shortness of breath  Cardiovascular: negative for chest pain, chest pressure/discomfort, irregular heart beat, lower extremity edema and palpitations  Gastrointestinal: negative for abdominal pain, constipation, diarrhea, jaundice, melena, odynophagia, reflux symptoms and vomiting  Hematologic/lymphatic: negative for bleeding, easy bruising, lymphadenopathy and petechiae  Musculoskeletal:negative for arthralgias, bone pain, muscle weakness, neck pain and stiff joints  Neurological: negative for dizziness, gait problems, headaches, seizures, speech problems, tremors and weakness  Behavioral/Psych: negative for anxiety, behavior problems, depression, fatigue and sleep disturbance  Endocrine: negative for diabetic symptoms including none, neuropathy, polyphagia, polyuria, polydipsia, vomiting and diarrhea and temperature intolerance  Allergic/Immunologic: negative for anaphylaxis, angioedema, hay fever and urticaria    Objective:     Patient Vitals for the past 8 hrs:   BP Temp Temp src Pulse Resp SpO2 Weight   01/27/20 1030 119/84 -- -- 109 -- 92 % --   01/27/20 1020 104/77 97.5 °F (36.4 °C) Temporal 107 19 95 % --   01/27/20 1015 96/76 -- -- 109 18 93 % --   01/27/20 1000 93/73 -- -- 109 19 97 % --   01/27/20 0950 (!) 106/91 98.1 °F (36.7 °C) Temporal 106 18 100 % --   01/27/20 0945 93/74 -- -- 104 22 100 % --   01/27/20 0940 105/74 -- -- 104 18 100 % --   01/27/20 0936 104/79 96.3 °F (35.7 °C) Temporal 104 28 100 % --   01/27/20 0755 103/89 97 °F (36.1 °C) Temporal 107 16 100 % --   01/27/20 0508 118/84 97.7 °F (36.5 °C) Oral (!) 220 18 94 % 179 lb 7 oz (81.4 kg)     No intake/output data recorded. I/O this shift:   In: 800 [I.V.:800]  Out: -     General Appearance: alert and oriented to person, place and time, well developed and well- nourished, in no acute distress  Skin: warm and dry, no rash or erythema  Head: normocephalic and atraumatic  Eyes: pupils equal, round, and reactive to light, extraocular eye movements intact, conjunctivae normal  ENT: external ear and ear canal normal bilaterally, nose without deformity, nasal mucosa and turbinates normal  Neck: supple and non-tender without mass, no cervical lymphadenopathy  Pulmonary/Chest: rales present-bilateral, coarse  Cardiovascular: normal rate, regular rhythm,  no murmurs, rubs, distal pulses intact, no carotid bruits  Abdomen: soft, non-tender, non-distended, normal bowel sounds, no masses or organomegaly  Lymph Nodes: Cervical, supraclavicular normal  Extremities: no cyanosis, clubbing or edema  Musculoskeletal: normal range of motion, no joint swelling, deformity or tenderness  Neurologic: alert, no focal neurologic

## 2020-01-27 NOTE — PROGRESS NOTES
Albuterol treament given. Patient tolerated well. Right and left lungs striderous. 6L NC.  ST.  VSS. Family at bedside. Will continue to monitor.

## 2020-01-27 NOTE — PROGRESS NOTES
Pt returned to room. Pt on 3L of O2. Needed 6L initially, but within 10 minutes he was able to wean to 3L. Head to toe assessment complete. Per endo RN, pt unable to eat/drink until 1111. Will clarify with MD what diet he is allowed to return to. Pt informed to notify RN when he wants to get OOB. Pt verbalized understanding. Pt denies further needs at this time. Call light in hand. Bed alarm set. Will continue to monitor.  Electronically signed by Emigdio Ruiz RN on 1/27/2020 at 11:01 AM

## 2020-01-27 NOTE — ANESTHESIA POSTPROCEDURE EVALUATION
[facility] Department of Anesthesiology  Post-Anesthesia Note       Name:  Maggie Johnston                                  Age:  51 y.o. MRN:  [id number]     Last Vitals & Oxygen Saturation: /77   Pulse 107   Temp 97.5 °F (36.4 °C) (Temporal)   Resp 19   Ht 5' 10\" (1.778 m)   Wt 179 lb 7 oz (81.4 kg)   SpO2 95%   BMI 25.75 kg/m²   Patient Vitals for the past 4 hrs:   BP Temp Temp src Pulse Resp SpO2   01/27/20 1020 104/77 97.5 °F (36.4 °C) Temporal 107 19 95 %   01/27/20 1015 96/76 -- -- 109 18 93 %   01/27/20 1000 93/73 -- -- 109 19 97 %   01/27/20 0950 (!) 106/91 98.1 °F (36.7 °C) Temporal 106 18 100 %   01/27/20 0945 93/74 -- -- 104 22 100 %   01/27/20 0940 105/74 -- -- 104 18 100 %   01/27/20 0936 104/79 96.3 °F (35.7 °C) Temporal 104 28 100 %   01/27/20 0755 103/89 97 °F (36.1 °C) Temporal 107 16 100 %       Level of consciousness:  Awake, alert    Respiratory: Respirations easy, no distress. Stable. Cardiovascular: Hemodynamically stable. Hydration: Adequate. PONV: Adequately managed. Post-op pain: Adequately controlled. Post-op assessment: Tolerated anesthetic well without complication. Complications:  None.     Maegan Dowd MD  January 27, 2020   10:36 AM

## 2020-01-27 NOTE — PROGRESS NOTES
Reviewed patient's medical and surgical history in electronic record and with patient at the bedside. All questions regarding procedure answered. Scope number and equipment verified using a two person system. Family in waiting room.     Electronically signed by Noemy Infante RN on 1/27/2020 at 8:52 AM

## 2020-01-28 LAB
ANION GAP SERPL CALCULATED.3IONS-SCNC: 13 MMOL/L (ref 3–16)
ANTI-JO1 IGG: <0.2 AI (ref 0–0.9)
ANTI-NUCLEAR ANTIBODY (ANA): NEGATIVE
ANTI-SCL70 IGG: <0.2 AI (ref 0–0.9)
ANTI-SS-A IGG: <0.2 AI (ref 0–0.9)
ANTI-SS-B IGG: <0.2 AI (ref 0–0.9)
BLOOD CULTURE, ROUTINE: NORMAL
BUN BLDV-MCNC: 28 MG/DL (ref 7–20)
CALCIUM SERPL-MCNC: 8.6 MG/DL (ref 8.3–10.6)
CHLORIDE BLD-SCNC: 102 MMOL/L (ref 99–110)
CHOLESTEROL, TOTAL: 129 MG/DL (ref 0–199)
CO2: 23 MMOL/L (ref 21–32)
CREAT SERPL-MCNC: 1 MG/DL (ref 0.9–1.3)
CULTURE, BLOOD 2: NORMAL
CYCLIC CITRULLINATED PEPTIDE ANTIBODY IGG: <0.5 U/ML (ref 0–2.9)
GFR AFRICAN AMERICAN: >60
GFR NON-AFRICAN AMERICAN: >60
GLUCOSE BLD-MCNC: 160 MG/DL (ref 70–99)
HDLC SERPL-MCNC: 46 MG/DL (ref 40–60)
LDL CHOLESTEROL CALCULATED: 69 MG/DL
POTASSIUM SERPL-SCNC: 4.9 MMOL/L (ref 3.5–5.1)
PRO-BNP: 4782 PG/ML (ref 0–124)
PROCALCITONIN: 0.18 NG/ML (ref 0–0.15)
SODIUM BLD-SCNC: 138 MMOL/L (ref 136–145)
TRIGL SERPL-MCNC: 71 MG/DL (ref 0–150)
VANCOMYCIN TROUGH: 12.3 UG/ML (ref 10–20)
VLDLC SERPL CALC-MCNC: 14 MG/DL

## 2020-01-28 PROCEDURE — 99232 SBSQ HOSP IP/OBS MODERATE 35: CPT | Performed by: NURSE PRACTITIONER

## 2020-01-28 PROCEDURE — 36415 COLL VENOUS BLD VENIPUNCTURE: CPT

## 2020-01-28 PROCEDURE — 80061 LIPID PANEL: CPT

## 2020-01-28 PROCEDURE — 99255 IP/OBS CONSLTJ NEW/EST HI 80: CPT | Performed by: INTERNAL MEDICINE

## 2020-01-28 PROCEDURE — 6370000000 HC RX 637 (ALT 250 FOR IP): Performed by: HOSPITALIST

## 2020-01-28 PROCEDURE — 83880 ASSAY OF NATRIURETIC PEPTIDE: CPT

## 2020-01-28 PROCEDURE — 87641 MR-STAPH DNA AMP PROBE: CPT

## 2020-01-28 PROCEDURE — 6360000002 HC RX W HCPCS

## 2020-01-28 PROCEDURE — 6360000002 HC RX W HCPCS: Performed by: NURSE PRACTITIONER

## 2020-01-28 PROCEDURE — 2580000003 HC RX 258

## 2020-01-28 PROCEDURE — 94640 AIRWAY INHALATION TREATMENT: CPT

## 2020-01-28 PROCEDURE — 2580000003 HC RX 258: Performed by: HOSPITALIST

## 2020-01-28 PROCEDURE — 6370000000 HC RX 637 (ALT 250 FOR IP): Performed by: PHYSICIAN ASSISTANT

## 2020-01-28 PROCEDURE — 2700000000 HC OXYGEN THERAPY PER DAY

## 2020-01-28 PROCEDURE — 6370000000 HC RX 637 (ALT 250 FOR IP): Performed by: NURSE PRACTITIONER

## 2020-01-28 PROCEDURE — 80048 BASIC METABOLIC PNL TOTAL CA: CPT

## 2020-01-28 PROCEDURE — 6360000002 HC RX W HCPCS: Performed by: INTERNAL MEDICINE

## 2020-01-28 PROCEDURE — 87449 NOS EACH ORGANISM AG IA: CPT

## 2020-01-28 PROCEDURE — 1200000000 HC SEMI PRIVATE

## 2020-01-28 PROCEDURE — 80202 ASSAY OF VANCOMYCIN: CPT

## 2020-01-28 PROCEDURE — 2580000003 HC RX 258: Performed by: NURSE PRACTITIONER

## 2020-01-28 PROCEDURE — 0100U HC RESPIRPTHGN MULT REV TRANS & AMP PRB TECH 21 TRGT: CPT

## 2020-01-28 PROCEDURE — 6370000000 HC RX 637 (ALT 250 FOR IP): Performed by: INTERNAL MEDICINE

## 2020-01-28 PROCEDURE — 84145 PROCALCITONIN (PCT): CPT

## 2020-01-28 PROCEDURE — 94761 N-INVAS EAR/PLS OXIMETRY MLT: CPT

## 2020-01-28 PROCEDURE — 99233 SBSQ HOSP IP/OBS HIGH 50: CPT | Performed by: INTERNAL MEDICINE

## 2020-01-28 PROCEDURE — 6360000002 HC RX W HCPCS: Performed by: HOSPITALIST

## 2020-01-28 RX ORDER — LEVOFLOXACIN 750 MG/1
750 TABLET ORAL DAILY
Status: DISCONTINUED | OUTPATIENT
Start: 2020-01-28 | End: 2020-01-30

## 2020-01-28 RX ORDER — LISINOPRIL 5 MG/1
5 TABLET ORAL DAILY
Status: DISCONTINUED | OUTPATIENT
Start: 2020-01-28 | End: 2020-01-30 | Stop reason: HOSPADM

## 2020-01-28 RX ORDER — LEVALBUTEROL INHALATION SOLUTION 1.25 MG/3ML
SOLUTION RESPIRATORY (INHALATION)
Status: COMPLETED
Start: 2020-01-28 | End: 2020-01-28

## 2020-01-28 RX ORDER — SODIUM CHLORIDE 9 MG/ML
INJECTION, SOLUTION INTRAVENOUS
Status: COMPLETED
Start: 2020-01-28 | End: 2020-01-28

## 2020-01-28 RX ADMIN — METOPROLOL TARTRATE 25 MG: 25 TABLET, FILM COATED ORAL at 22:07

## 2020-01-28 RX ADMIN — LISINOPRIL 5 MG: 5 TABLET ORAL at 19:43

## 2020-01-28 RX ADMIN — ALPRAZOLAM 0.5 MG: 0.5 TABLET ORAL at 15:17

## 2020-01-28 RX ADMIN — METHYLPREDNISOLONE SODIUM SUCCINATE 40 MG: 40 INJECTION, POWDER, FOR SOLUTION INTRAMUSCULAR; INTRAVENOUS at 09:16

## 2020-01-28 RX ADMIN — SODIUM CHLORIDE 250 ML: 9 INJECTION, SOLUTION INTRAVENOUS at 10:48

## 2020-01-28 RX ADMIN — PANTOPRAZOLE SODIUM 40 MG: 40 TABLET, DELAYED RELEASE ORAL at 05:02

## 2020-01-28 RX ADMIN — ONDANSETRON 4 MG: 2 INJECTION INTRAMUSCULAR; INTRAVENOUS at 05:01

## 2020-01-28 RX ADMIN — Medication 2 PUFF: at 20:58

## 2020-01-28 RX ADMIN — CEFEPIME HYDROCHLORIDE 2 G: 2 INJECTION, POWDER, FOR SOLUTION INTRAVENOUS at 10:47

## 2020-01-28 RX ADMIN — LEVALBUTEROL HYDROCHLORIDE: 1.25 SOLUTION RESPIRATORY (INHALATION) at 09:42

## 2020-01-28 RX ADMIN — ALPRAZOLAM 0.5 MG: 0.5 TABLET ORAL at 22:08

## 2020-01-28 RX ADMIN — ALPRAZOLAM 0.5 MG: 0.5 TABLET ORAL at 09:15

## 2020-01-28 RX ADMIN — Medication 2 PUFF: at 09:17

## 2020-01-28 RX ADMIN — METOPROLOL TARTRATE 25 MG: 25 TABLET, FILM COATED ORAL at 09:15

## 2020-01-28 RX ADMIN — Medication 10 ML: at 22:08

## 2020-01-28 RX ADMIN — LEVOFLOXACIN 750 MG: 750 TABLET, FILM COATED ORAL at 15:17

## 2020-01-28 RX ADMIN — Medication 10 ML: at 05:02

## 2020-01-28 RX ADMIN — IBUPROFEN 600 MG: 600 TABLET, FILM COATED ORAL at 05:01

## 2020-01-28 RX ADMIN — LEVALBUTEROL HYDROCHLORIDE 1.25 MG: 1.25 SOLUTION RESPIRATORY (INHALATION) at 20:58

## 2020-01-28 RX ADMIN — VANCOMYCIN HYDROCHLORIDE 1250 MG: 10 INJECTION, POWDER, LYOPHILIZED, FOR SOLUTION INTRAVENOUS at 12:13

## 2020-01-28 RX ADMIN — IPRATROPIUM BROMIDE AND ALBUTEROL SULFATE 1 AMPULE: .5; 3 SOLUTION RESPIRATORY (INHALATION) at 16:50

## 2020-01-28 RX ADMIN — FUROSEMIDE 40 MG: 10 INJECTION, SOLUTION INTRAMUSCULAR; INTRAVENOUS at 18:21

## 2020-01-28 RX ADMIN — SERTRALINE 50 MG: 50 TABLET, FILM COATED ORAL at 22:08

## 2020-01-28 RX ADMIN — FUROSEMIDE 40 MG: 10 INJECTION, SOLUTION INTRAMUSCULAR; INTRAVENOUS at 09:16

## 2020-01-28 RX ADMIN — Medication 10 ML: at 09:16

## 2020-01-28 ASSESSMENT — ENCOUNTER SYMPTOMS
CONSTIPATION: 0
RHINORRHEA: 0
ABDOMINAL PAIN: 0
BACK PAIN: 0
COUGH: 1
EYE REDNESS: 0
SORE THROAT: 0
SHORTNESS OF BREATH: 1
WHEEZING: 0
NAUSEA: 0
DIARRHEA: 0
TROUBLE SWALLOWING: 0
EYE DISCHARGE: 0

## 2020-01-28 ASSESSMENT — PAIN SCALES - GENERAL: PAINLEVEL_OUTOF10: 2

## 2020-01-28 ASSESSMENT — PAIN DESCRIPTION - DESCRIPTORS: DESCRIPTORS: HEADACHE

## 2020-01-28 ASSESSMENT — PAIN DESCRIPTION - PAIN TYPE: TYPE: ACUTE PAIN

## 2020-01-28 ASSESSMENT — PAIN DESCRIPTION - LOCATION: LOCATION: HEAD

## 2020-01-28 NOTE — CONSULTS
fever, hypotension, hives, lip swelling and redness or purulence at vascular access sites. I/v access Management:    · Continue to monitor i.v access sites for erythema, induration, discharge or tenderness. · As always, continue efforts to minimizetubes/lines/drains as clinically appropriate to reduce chances of line associated infections. Current isolation precautions: There are no current isolations documented for this patient. Patient education and counseling:      · The patient was educated in detailabout the side-effects of various antibiotics and things to watch for like new rashes, lip swelling, severe reaction, worsening diarrhea, break through fever etc.  · Discussed patient'scondition and what to expect. All of the patient's questions were addressed in a satisfactory manner and patient verbalized understanding all instructions. Level of complexity of visit: High     Risk of Complications/Morbidity: High     · Illness(es)/ Infection present that pose threat to life/bodily function. · There is potential for severe exacerbation of infection/side effects of treatment. · Therapy requires intensive monitoring for antimicrobial agent toxicity. Thank you for involving me in the care of your patient. I will continue to follow. If you have any additional questions, please do not hesitate to contact me. Subjective:     Presenting complaint in ER:     Chief Complaint   Patient presents with    Cough     Pt. comes in today with complaints of a cough. Pt. reports that he was just recently released from [facility] and being treated for pneumonia. Pt. states that he is not feeling any better. HPI: Moe Osorio is a 51 y.o. male patient, who was seen at the request of Dr. Onelia Valdez MD.    History was obtained from chart review and the patient. The patient was admitted on 1/24/2020. I have been consulted to see the patient for above mentioned reason(s).     The patient has multiple medical comorbidities, and presented to the ER originally for cough, congestion, bronchospasm and generalized body aches and atypical pneumonia. The patient was afebrile in the ER but had a white cell count of 14,600. He was admitted. He was started empirically on IV vancomycin and IV cefepime. He had a CT angiogram of the chest done which showed no evidence of pulmonary embolism and multifocal pneumonia involving the right upper and middle lobes. He also had a bronchoscopy done on January 27 and AFB and fungal stains are negative. Blood. Blood cultures are in process. The patient is being seen by cardiology for newly diagnosed congestive heart failure and nonischemic cardiomyopathy. I have been asked to see the patient for this complicated infection and for possible clearance for cardiac catheterization. .                   Past Medical History: All past medical history reviewed today. Past Medical History:   Diagnosis Date    Anxiety     Asthma     Bipolar affective disorder ([facility] [address] 6/4/2010    Depression 6/4/2010    GERD (gastroesophageal reflux disease) 4/28/2010    Hypertension     Impotence 3/31/2010    Insomnia          Past Surgical History: All pastsurgical history was reviewed today. Past Surgical History:   Procedure Laterality Date    BRONCHOSCOPY N/A 1/27/2020    BRONCHOSCOPY ALVEOLAR LAVAGE performed by Coby Sullivan MD at [address] [place] [address]  1/27/2020    BRONCHOSCOPY BRUSHINGS performed by Coby Sullivan MD at [address] [place] [address]         Family History: All family history was reviewed today. History reviewed. No pertinent family history. Medications: All current and past medications were reviewed.     Medications Prior to Admission: budesonide-formoterol (SYMBICORT) 160-4.5 MCG/ACT AERO, Inhale 2 puffs into the lungs 2 times daily  albuterol sulfate  (90 Base) MCG/ACT inhaler, Inhale 2 puffs into the lungs 4 times daily as needed for Wheezing  guaiFENesin (ALTARUSSIN) 100 MG/5ML syrup, Take 10 mLs by mouth every 4 hours as needed for Cough  omeprazole (PRILOSEC) 40 MG delayed release capsule, Take 40 mg by mouth daily   ALPRAZolam (XANAX) 0.5 MG tablet, Take 0.5 mg by mouth 2 times daily as needed. sertraline (ZOLOFT) 50 MG tablet, Take 50 mg by mouth nightly as needed  amLODIPine (NORVASC) 5 MG tablet, Take 5 mg by mouth daily     lisinopril  5 mg Oral Daily    levofloxacin  750 mg Oral Daily    metoprolol tartrate  25 mg Oral BID    ALPRAZolam  0.5 mg Oral TID    furosemide  40 mg Intravenous BID    methylPREDNISolone  40 mg Intravenous Daily    mometasone-formoterol  2 puff Inhalation BID    pantoprazole  40 mg Oral QAM AC    sertraline  50 mg Oral Daily    sodium chloride flush  10 mL Intravenous 2 times per day    enoxaparin  40 mg Subcutaneous Nightly    ipratropium-albuterol  1 ampule Inhalation Q4H WA          REVIEW OF SYSTEMS:       Review of Systems   Constitutional: Positive for fatigue. Negative for chills, diaphoresis and fever. HENT: Negative for ear discharge, ear pain, rhinorrhea, sore throat and trouble swallowing. Eyes: Negative for discharge and redness. Respiratory: Positive for cough and shortness of breath. Negative for wheezing. Cardiovascular: Negative for chest pain and leg swelling. Gastrointestinal: Negative for abdominal pain, constipation, diarrhea and nausea. Endocrine: Negative for polyuria. Genitourinary: Negative for dysuria, flank pain, frequency, hematuria and urgency. Musculoskeletal: Negative for back pain and myalgias. Skin: Negative for rash. Neurological: Negative for dizziness, seizures and headaches. Hematological: Does not bruise/bleed easily. Psychiatric/Behavioral: Negative for hallucinations and suicidal ideas. All other systems reviewed and are negative.          Objective:       PHYSICAL EXAM:      Vitals:   Vitals:    01/28/20 baseline. Motor: No abnormal muscle tone. Psychiatric:         Mood and Affect: Mood normal.         Behavior: Behavior normal.         Thought Content: Thought content normal.       Lines: All vascular access sites are healthy with no local erythema, discharge or tenderness. Intake and output:     I/O last 3 completed shifts: In: 800 [I.V.:800]  Out: -     Lab Data:   All available labs were reviewed by me today. CBC: No results for input(s): WBC, RBC, HGB, HCT, PLT, MCV, MCH, MCHC, RDW, NRBC, SEGSPCT, BANDSPCT in the last 72 hours. BMP:  Recent Labs     01/28/20  0637      K 4.9      CO2 23   BUN 28*   CREATININE 1.0   CALCIUM 8.6   GLUCOSE 160*        Hepatic FunctionPanel:   Lab Results   Component Value Date    ALKPHOS 68 01/24/2020    ALT 51 01/24/2020    AST 21 01/24/2020    PROT 5.9 01/24/2020    BILITOT 0.6 01/24/2020    LABALBU 3.0 01/25/2020       CPK: No results found for: CKTOTAL  ESR:   Lab Results   Component Value Date    SEDRATE 76 (H) 01/26/2020     CRP: No results found for: CRP      Imaging: All pertinent images and reports for the current visit were reviewed by meduring this visit. CT CHEST PULMONARY EMBOLISM W CONTRAST   Final Result   1. No evidence for acute pulmonary embolism. 2. Interval progression of extensive multifocal pneumonia in the right upper   and middle lobe with new areas in the left upper and lower lobes indicating   worsening pneumonia. 3. Stable moderate right pleural effusion. 4. There is trace right pleural effusion significantly improved from prior. Subsegmental atelectasis posterior left lung base on prior also resolved. VL Extremity Venous Bilateral   Final Result      XR CHEST STANDARD (2 VW)   Final Result   Scattered airspace opacities concerning for pneumonia that is worse compared   to prior examination.              Outside records:    Labs, Microbiology, Radiology and pertinent results from Care everywhere, if

## 2020-01-28 NOTE — PROGRESS NOTES
[facility] Pulmonary/CCM Progress note      Admit Date: 1/24/2020    Chief Complaint: Cough and shortness of breath    Subjective: Interval History: Shortness of breath and leg edema. No significant phlegm with the cough. No fevers.     Scheduled Meds:   lisinopril  5 mg Oral Daily    metoprolol tartrate  25 mg Oral BID    ALPRAZolam  0.5 mg Oral TID    furosemide  40 mg Intravenous BID    vancomycin  15 mg/kg Intravenous Q12H    methylPREDNISolone  40 mg Intravenous Daily    mometasone-formoterol  2 puff Inhalation BID    pantoprazole  40 mg Oral QAM AC    sertraline  50 mg Oral Daily    sodium chloride flush  10 mL Intravenous 2 times per day    enoxaparin  40 mg Subcutaneous Nightly    cefepime  2 g Intravenous Q12H    ipratropium-albuterol  1 ampule Inhalation Q4H WA     Continuous Infusions:  PRN Meds:potassium chloride **OR** potassium alternative oral replacement **OR** potassium chloride, ibuprofen, promethazine-dextromethorphan, levalbuterol, perflutren lipid microspheres, albuterol sulfate HFA, sodium chloride flush, magnesium hydroxide, ondansetron, guaiFENesin-dextromethorphan    Review of Systems  Constitutional: negative for fatigue, fevers, malaise and weight loss  Ears, nose, mouth, throat: negative for ear drainage, epistaxis, hoarseness, nasal congestion, sore throat and voice change  Respiratory: negative except for cough and shortness of breath  Cardiovascular: negative for chest pain, chest pressure/discomfort, irregular heart beat, lower extremity edema and palpitations  Gastrointestinal: negative for abdominal pain, constipation, diarrhea, jaundice, melena, odynophagia, reflux symptoms and vomiting  Hematologic/lymphatic: negative for bleeding, easy bruising, lymphadenopathy and petechiae  Musculoskeletal:negative for arthralgias, bone pain, muscle weakness, neck pain and stiff joints  Neurological: negative for dizziness, gait problems, headaches, seizures, speech problems, tremors CREATININE 1.0 01/28/2020    CALCIUM 8.6 01/28/2020     ABG: No results found for: RFI4GKY, BEART, J9RTQTQF, PHART, THGBART, EUO2RPN, PO2ART, YTF5FHI    Radiology: All pertinent images / reports were reviewed as a part of this visit. Narrative   EXAMINATION:   CTA OF THE CHEST 1/26/2020 8:51 am       TECHNIQUE:   CTA of the chest was performed after the administration of intravenous   contrast.  Multiplanar reformatted images are provided for review.  MIP   images are provided for review. Dose modulation, iterative reconstruction,   and/or weight based adjustment of the mA/kV was utilized to reduce the   radiation dose to as low as reasonably achievable.       COMPARISON:   01/16/2020 [facility]       HISTORY:   ORDERING SYSTEM PROVIDED HISTORY: persistent tachycardia ,  r/o PE   TECHNOLOGIST PROVIDED HISTORY:   Reason for exam:->persistent tachycardia ,  r/o PE   Reason for Exam: persistent tachycardia ,  r/o PE   Acuity: Acute   Type of Exam: Initial       FINDINGS:   Pulmonary Arteries: Pulmonary arteries are adequately opacified for   evaluation.  No evidence of intraluminal filling defect to suggest pulmonary   embolism.  Main pulmonary artery is normal in caliber.       Mediastinum: Borderline cardiomegaly.  Some calcified mediastinal nodes.  No   significant lymphadenopathy.  Normal caliber aorta.  Thyroid gland and   esophagus grossly normal.       Lungs/pleura:  Moderate right pleural effusion unchanged.  Trace left pleural   effusion showing significant improvement from prior.  Left posterior basal   subsegmental atelectasis on prior has resolved.       Redemonstration of patchy diffuse discrete and confluent airspace disease   with consolidation in the right upper middle and lower lobes showing   significant progression in extent compared to prior.  Additionally multifocal   areas of airspace consolidation has developed in the anterior segment left   upper lobe and in the superior segment left

## 2020-01-28 NOTE — PROGRESS NOTES
The [place] Sleepiness Scale       The [place] Sleepiness Scale is widely used in the field of sleep medicine as a subjective measure of a patient's sleepiness. The test is a list of eight situations in which you rate your tendency to become sleepy on a scale of 0, no chance to 3, high chance of dozing. Your score is based on a scale of 0 to 24. The scale estimates whether you are experiencing excessive sleepiness that possibly requires medical attention. How Sleepy Are You? How sleepy are you to doze off or fall asleep in the following situations? You should rate your chances of dozing off, not just feeling tired. Even if you have not done some of these things recently try to determine how they would have affected you.  For each situation, decide whether or not you would have:     0 = No chance of dozing 1 = Slight chance of dozing   2 = Moderate chance of  dozing 3 = High change of dozing       Situation                                                                                     Chance of Dozing    Sitting and reading  0 =  [x]  1 =    [] 2 =    [] 3 =    []    Watching TV  0 =  []  1 =    [] 2 =    [] 3 =    [x]      Sitting inactive in public place (e.g., a theater or a meeting)  0 =  [x]  1 =    [] 2 =    [] 3 =    []    As a passenger in a car for an hour without a break          0  =  []  1 =    [] 2 =    [x] 3 =    []    Lying down to rest in the afternoon when circumstances permit    0 =  []  1 =    [] 2 =    [] 3 =    [x]    Sitting and talking to someone  0 =  [x]  1 =    [] 2 =    [] 3 =    []      Sitting quietly after a lunch without alcohol  0 =  [x]  1 =    [] 2 =    [] 3 =    []    In a car, while stopped for a few minutes in traffic                                                                      0 =  [x]  1 =    [] 2 =    [] 3 =    []    Total Score = 8      If your total score is 10 or greater, you are experiencing excessive sleepiness and should consider seeking a medical follow-up. Take a copy of this screening test to your primary care physician on your next office visit. Interpretation:      0 -   7: It is unlikely that you are abnormally sleepy. 8 -   9: You have an average amount of daytime sleepiness. 10 - 15: You may be excessively sleepy depending on the situation. You may want to consider seeking medical attention. 16 - 24: You are excessively sleepy and should consider seeking medical attention.       Electronically signed by Priscilla Thao RCP on 1/28/2020 at 5:04 PM

## 2020-01-28 NOTE — PLAN OF CARE
Problem: Discharge Planning:  Goal: Discharged to appropriate level of care  Description  Discharged to appropriate level of care  Outcome: Ongoing  Goal: Participates in care planning  Description  Participates in care planning  Outcome: Ongoing     Problem: Airway Clearance - Ineffective:  Goal: Clear lung sounds  Description  Clear lung sounds  Outcome: Ongoing  Goal: Ability to maintain a clear airway will improve  Description  Ability to maintain a clear airway will improve  Outcome: Ongoing     Problem: Gas Exchange - Impaired:  Goal: Levels of oxygenation will improve  Description  Levels of oxygenation will improve  Outcome: Ongoing     Problem: Tobacco Use:  Goal: Will participate in inpatient tobacco-use cessation counseling  Description  Will participate in inpatient tobacco-use cessation counseling  Outcome: Ongoing     Problem: Pain:  Goal: Pain level will decrease  Description  Pain level will decrease  Outcome: Ongoing  Goal: Control of acute pain  Description  Control of acute pain  Outcome: Ongoing  Goal: Control of chronic pain  Description  Control of chronic pain  Outcome: Ongoing

## 2020-01-28 NOTE — DISCHARGE INSTR - COC
Continuity of Care Form    Patient Name: Margit Shone   :  1970  MRN:  [id number]    Admit date:  2020  Discharge date:  ***    Code Status Order: Full Code   Advance Directives:   Advance Care Flowsheet Documentation     Date/Time Healthcare Directive Type of Healthcare Directive Copy in [address] Agent's Name Healthcare Agent's Phone Number    20 2888  No, patient does not have an advance directive for healthcare treatment -- -- -- -- --    20 0737  No, patient does not have an advance directive for healthcare treatment -- -- -- -- --    20 1747  No, patient does not have an advance directive for healthcare treatment -- -- -- -- --          Admitting Physician:  Reza Rothman MD  PCP: Black Acosta MD    Discharging Nurse: [facility] Unit/Room#: [id number]  Discharging Unit Phone Number: ***    Emergency Contact:   Extended Emergency Contact Information  Primary Emergency Contact: Malia Glass  Address: [address] [place] [address] [address]           [place], [address] [place] [address] Phone: [phone]  Relation: Parent    Past Surgical History:  Past Surgical History:   Procedure Laterality Date    BRONCHOSCOPY N/A 2020    BRONCHOSCOPY ALVEOLAR LAVAGE performed by Jessica Savage MD at [place] [address]  2020    BRONCHOSCOPY BRUSHINGS performed by Jessica Savage MD at [address] [place] [address]       Immunization History:   Immunization History   Administered Date(s) Administered    Influenza 2013       Active Problems:  Patient Active Problem List   Diagnosis Code    Anxiety F41.9    Asthma J45.909    Bipolar affective disorder ([address] F31.9    Depression F32.9    Erectile dysfunction N52.9    Multifocal pneumonia J18.9    Acute respiratory failure with hypoxia ([address] J96.01    EKG abnormalities R94.31    Peripheral edema R60.9    Nonischemic cardiomyopathy ([address] I42.8    Smoker F17.200    Essential hypertension I10       Isolation/Infection:   Isolation          No Isolation        Patient Infection Status     None to display          Nurse Assessment:  Last Vital Signs: BP 93/71   Pulse 99   Temp 97.7 °F (36.5 °C) (Oral)   Resp 18   Ht 5' 10\" (1.778 m)   Wt 178 lb 4.8 oz (80.9 kg)   SpO2 96%   BMI 25.58 kg/m²     Last documented pain score (0-10 scale): Pain Level: 2  Last Weight:   Wt Readings from Last 1 Encounters:   01/28/20 178 lb 4.8 oz (80.9 kg)     Mental Status:  {IP PT MENTAL STATUS:80386}    IV Access:  { TRVA IV ACCESS:464303859}    Nursing Mobility/ADLs:  Walking   {CHP DME IDKS:073957526}  Transfer  {CHP DME YTQT:386406906}  Bathing  {CHP DME FJGP:996078328}  Dressing  {CHP DME PNNS:421794701}  Toileting  {CHP DME RPMF:576158851}  Feeding  {P DME ROUY:872558189}  Med Admin  {P DME JWQM:238971984}  Med Delivery   { TRAV MED Delivery:693106153}    Wound Care Documentation and Therapy:        Elimination:  Continence:   · Bowel: {YES / DP:89318}  · Bladder: {YES / JQ:58122}  Urinary Catheter: {Urinary Catheter:212621516}   Colostomy/Ileostomy/Ileal Conduit: {YES / IV:64499}       Date of Last BM: ***  No intake or output data in the 24 hours ending 01/28/20 1459  I/O last 3 completed shifts:   In: 800 [I.V.:800]  Out: -     Safety Concerns:     [address] Safety Concerns:194143117}    Impairments/Disabilities:      [address] Impairments/Disabilities:984595795}    Nutrition Therapy:  Current Nutrition Therapy:   [address] Diet List:147852982}    Routes of Feeding: {CHP DME Other Feedings:471207813}  Liquids: {Slp liquid thickness:84266}  Daily Fluid Restriction: {CHP DME Yes amt example:977425709}  Last Modified Barium Swallow with Video (Video Swallowing Test): {Done Not Done GJCN:552261465}    Treatments at the Time of Hospital Discharge:   Respiratory Treatments: ***  Oxygen Therapy:  {Therapy; copd oxygen:79953}  Ventilator:    { CC Vent

## 2020-01-28 NOTE — PROGRESS NOTES
 furosemide  40 mg Intravenous BID    vancomycin  15 mg/kg Intravenous Q12H    methylPREDNISolone  40 mg Intravenous Daily    mometasone-formoterol  2 puff Inhalation BID    pantoprazole  40 mg Oral QAM AC    sertraline  50 mg Oral Daily    sodium chloride flush  10 mL Intravenous 2 times per day    enoxaparin  40 mg Subcutaneous Nightly    cefepime  2 g Intravenous Q12H    ipratropium-albuterol  1 ampule Inhalation Q4H WA       potassium chloride **OR** potassium alternative oral replacement **OR** potassium chloride, ibuprofen, promethazine-dextromethorphan, levalbuterol, perflutren lipid microspheres, albuterol sulfate HFA, sodium chloride flush, magnesium hydroxide, ondansetron, guaiFENesin-dextromethorphan    Lab Data:  CBC: No results for input(s): WBC, HGB, PLT in the last 72 hours. BMP:    Recent Labs     01/28/20  0637      K 4.9   CO2 23   BUN 28*   CREATININE 1.0     LIVR: No results for input(s): AST, ALT in the last 72 hours. PT/INR: No results for input(s): PROT, INR in the last 72 hours. APTT: No results for input(s): APTT in the last 72 hours. BNP:  No results for input(s): BNP in the last 72 hours. CARDIAC ENZYMES:No results for input(s): CKMB, CKMBINDEX, TROPONINI in the last 72 hours. Invalid input(s): CKTOTAL;3  FASTING LIPID PANEL:  Lab Results   Component Value Date    CHOL 161 04/20/2010    HDL 34 04/20/2010    TRIG 92 04/20/2010       Diagnostics:       ECHO:   10/2015  Left Ventricle: Overall left ventricular ejection fraction is  estimated to be 50-55%. The left ventricular function is low normal.  The left ventricle is normal in size. There is normal left  ventricular wall thickness. There is borderline global hypokinesis of  the left ventricle. No left ventricular thrombus detected. Right Ventricle: The right ventricle is normal size. There is normal  right ventricular wall thickness.  The right ventricular systolic  function is normal.  Left Atrium: The left atrial size is normal.  Right Atrium: Right atrial size is normal.  Mitral Valve: The mitral valve leaflets appear normal. There is no  evidence of stenosis, fluttering, or prolapse. There is no mitral  regurgitation noted. There is no evidence of mitral valve prolapse. There is no mitral valve stenosis. Aortic Valve: The aortic valve is normal in structure. No aortic  regurgitation is present. No hemodynamically significant valvular  aortic stenosis. Aortic Root: The aortic root is normal size. Tricuspid Valve: The tricuspid valve is normal in structure and  function. Trace tricuspid regurgitation is present. The right  ventricular systolic pressure is 47.6 mmHg. Right ventricular  systolic pressure is normal.  Diastolic Function: Normal Diastolic Function. Pulmonic Valve/Pulmonary Artery: The pulmonic valve is normal in  structure. There is trace pulmonic valvular regurgitation. Pericardium: There is no pericardial effusion. There is no pleural  Effusion.        1/27/20: ECHO    Summary   -Left ventricular cavity size is severely dilated. Normal left ventricular   wall thickness.   - LV dysfunction is severely depressed with ejection fraction estimated to   be 20% with severe global hypokinesis. Grade III diastolic dysfunction with   elevated LV filling pressures.   -Severe mitral regurgitation.   -The left atrium is dilated.   -Moderate tricuspid regurgitation with PASP of 40 mmHg.   -The right atrial size is dilated.     Patient Active Problem List   Diagnosis    Anxiety    Asthma    Bipolar affective disorder ([address]    Depression    Erectile dysfunction    HCAP (healthcare-associated pneumonia)    Acute respiratory failure with hypoxia ([address]       Assessment/plan:   1. Cardiomyopathy/ CHF      On Lopressor , Lisinopril, and Lasix      Patient appears stable on physical exam      Plan: continue current medications    2. Pneumonia- on antibiotics     3.  Hypertension- stable, continue current

## 2020-01-28 NOTE — PROGRESS NOTES
Bedside rounding completed with Isai Drummond RN. Pt denies needs at this time. White board updated. Call light in hand and pt verbalizes correct use. All needs within reach.   Electronically signed by Jasiel Quintana RN on 1/27/2020 at 7:14 PM

## 2020-01-28 NOTE — PROGRESS NOTES
Shift assessment complete. VSS. Pt denies pain and SOB. Morning medications given, Zoloft held, rescheduled for nightly per pt request. Pt states IV is beginning to burn when flushing. Will replace prior to antibiotic infusions. Pt states pulmonary stated possibly to have cardiology perform an angiogram, will f/u with cardiology. Pt up eating and is worried about heart rate and refused duoneb from RT. No further needs at this time. Will monitor.

## 2020-01-29 LAB
ANION GAP SERPL CALCULATED.3IONS-SCNC: 12 MMOL/L (ref 3–16)
ASPERGILLUS GALACTO AG: NEGATIVE
ASPERGILLUS GALACTO INDEX: 0.17
BASOPHILS ABSOLUTE: 0 K/UL (ref 0–0.2)
BASOPHILS RELATIVE PERCENT: 0.2 %
BUN BLDV-MCNC: 28 MG/DL (ref 7–20)
CALCIUM SERPL-MCNC: 8.7 MG/DL (ref 8.3–10.6)
CHLORIDE BLD-SCNC: 101 MMOL/L (ref 99–110)
CO2: 25 MMOL/L (ref 21–32)
CREAT SERPL-MCNC: 1 MG/DL (ref 0.9–1.3)
CULTURE, RESPIRATORY: NORMAL
EOSINOPHILS ABSOLUTE: 0 K/UL (ref 0–0.6)
EOSINOPHILS RELATIVE PERCENT: 0.2 %
GFR AFRICAN AMERICAN: >60
GFR NON-AFRICAN AMERICAN: >60
GLUCOSE BLD-MCNC: 142 MG/DL (ref 70–99)
GRAM STAIN RESULT: NORMAL
HCT VFR BLD CALC: 33.8 % (ref 40.5–52.5)
HEMOGLOBIN: 10.9 G/DL (ref 13.5–17.5)
INFLUENZA A BY PCR: NOT DETECTED
INFLUENZA B BY PCR: NOT DETECTED
L. PNEUMOPHILA SEROGP 1 UR AG: NORMAL
LYMPHOCYTES ABSOLUTE: 2.2 K/UL (ref 1–5.1)
LYMPHOCYTES RELATIVE PERCENT: 12.6 %
MCH RBC QN AUTO: 31.3 PG (ref 26–34)
MCHC RBC AUTO-ENTMCNC: 32.2 G/DL (ref 31–36)
MCV RBC AUTO: 97.2 FL (ref 80–100)
MONOCYTES ABSOLUTE: 1.3 K/UL (ref 0–1.3)
MONOCYTES RELATIVE PERCENT: 7.4 %
MRSA SCREEN RT-PCR: NORMAL
NEUTROPHILS ABSOLUTE: 13.8 K/UL (ref 1.7–7.7)
NEUTROPHILS RELATIVE PERCENT: 79.6 %
PDW BLD-RTO: 14.1 % (ref 12.4–15.4)
PLATELET # BLD: 425 K/UL (ref 135–450)
PMV BLD AUTO: 7.1 FL (ref 5–10.5)
PNEUMOCYSTIS JIROVECI DFA: NEGATIVE
PNEUMOCYSTIS SOURCE: NORMAL
POTASSIUM SERPL-SCNC: 4.5 MMOL/L (ref 3.5–5.1)
RBC # BLD: 3.48 M/UL (ref 4.2–5.9)
REPORT: NORMAL
RESPIRATORY PANEL PCR: NORMAL
RSV BY PCR: NOT DETECTED
RSV SOURCE: NORMAL
SODIUM BLD-SCNC: 138 MMOL/L (ref 136–145)
STREP PNEUMONIAE ANTIGEN, URINE: NORMAL
WBC # BLD: 17.4 K/UL (ref 4–11)

## 2020-01-29 PROCEDURE — 85025 COMPLETE CBC W/AUTO DIFF WBC: CPT

## 2020-01-29 PROCEDURE — 6360000002 HC RX W HCPCS

## 2020-01-29 PROCEDURE — 2580000003 HC RX 258

## 2020-01-29 PROCEDURE — 6370000000 HC RX 637 (ALT 250 FOR IP): Performed by: HOSPITALIST

## 2020-01-29 PROCEDURE — 6360000002 HC RX W HCPCS: Performed by: NURSE PRACTITIONER

## 2020-01-29 PROCEDURE — 99233 SBSQ HOSP IP/OBS HIGH 50: CPT | Performed by: INTERNAL MEDICINE

## 2020-01-29 PROCEDURE — 6360000002 HC RX W HCPCS: Performed by: INTERNAL MEDICINE

## 2020-01-29 PROCEDURE — 94640 AIRWAY INHALATION TREATMENT: CPT

## 2020-01-29 PROCEDURE — C1894 INTRO/SHEATH, NON-LASER: HCPCS

## 2020-01-29 PROCEDURE — 2580000003 HC RX 258: Performed by: HOSPITALIST

## 2020-01-29 PROCEDURE — 2500000003 HC RX 250 WO HCPCS

## 2020-01-29 PROCEDURE — 1200000000 HC SEMI PRIVATE

## 2020-01-29 PROCEDURE — 80048 BASIC METABOLIC PNL TOTAL CA: CPT

## 2020-01-29 PROCEDURE — 2580000003 HC RX 258: Performed by: INTERNAL MEDICINE

## 2020-01-29 PROCEDURE — 93459 L HRT ART/GRFT ANGIO: CPT

## 2020-01-29 PROCEDURE — 99152 MOD SED SAME PHYS/QHP 5/>YRS: CPT

## 2020-01-29 PROCEDURE — 6370000000 HC RX 637 (ALT 250 FOR IP): Performed by: PHYSICIAN ASSISTANT

## 2020-01-29 PROCEDURE — 36415 COLL VENOUS BLD VENIPUNCTURE: CPT

## 2020-01-29 PROCEDURE — 94761 N-INVAS EAR/PLS OXIMETRY MLT: CPT

## 2020-01-29 PROCEDURE — 99153 MOD SED SAME PHYS/QHP EA: CPT

## 2020-01-29 PROCEDURE — 2709999900 HC NON-CHARGEABLE SUPPLY

## 2020-01-29 PROCEDURE — 6370000000 HC RX 637 (ALT 250 FOR IP): Performed by: INTERNAL MEDICINE

## 2020-01-29 PROCEDURE — 6370000000 HC RX 637 (ALT 250 FOR IP): Performed by: NURSE PRACTITIONER

## 2020-01-29 PROCEDURE — C1769 GUIDE WIRE: HCPCS

## 2020-01-29 PROCEDURE — 6360000002 HC RX W HCPCS: Performed by: HOSPITALIST

## 2020-01-29 PROCEDURE — 93458 L HRT ARTERY/VENTRICLE ANGIO: CPT | Performed by: INTERNAL MEDICINE

## 2020-01-29 RX ORDER — SODIUM CHLORIDE 0.9 % (FLUSH) 0.9 %
10 SYRINGE (ML) INJECTION PRN
Status: DISCONTINUED | OUTPATIENT
Start: 2020-01-29 | End: 2020-01-30 | Stop reason: SDUPTHER

## 2020-01-29 RX ORDER — METOPROLOL SUCCINATE 50 MG/1
50 TABLET, EXTENDED RELEASE ORAL NIGHTLY
Status: DISCONTINUED | OUTPATIENT
Start: 2020-01-29 | End: 2020-01-30 | Stop reason: HOSPADM

## 2020-01-29 RX ORDER — SODIUM CHLORIDE 0.9 % (FLUSH) 0.9 %
10 SYRINGE (ML) INJECTION EVERY 12 HOURS SCHEDULED
Status: DISCONTINUED | OUTPATIENT
Start: 2020-01-29 | End: 2020-01-30 | Stop reason: SDUPTHER

## 2020-01-29 RX ADMIN — METOPROLOL SUCCINATE 50 MG: 50 TABLET, EXTENDED RELEASE ORAL at 21:19

## 2020-01-29 RX ADMIN — ENOXAPARIN SODIUM 40 MG: 40 INJECTION SUBCUTANEOUS at 21:19

## 2020-01-29 RX ADMIN — SERTRALINE 50 MG: 50 TABLET, FILM COATED ORAL at 21:18

## 2020-01-29 RX ADMIN — ALPRAZOLAM 0.5 MG: 0.5 TABLET ORAL at 15:08

## 2020-01-29 RX ADMIN — ALPRAZOLAM 0.5 MG: 0.5 TABLET ORAL at 21:18

## 2020-01-29 RX ADMIN — ALPRAZOLAM 0.5 MG: 0.5 TABLET ORAL at 11:06

## 2020-01-29 RX ADMIN — METHYLPREDNISOLONE SODIUM SUCCINATE 40 MG: 40 INJECTION, POWDER, FOR SOLUTION INTRAMUSCULAR; INTRAVENOUS at 11:08

## 2020-01-29 RX ADMIN — LEVALBUTEROL HYDROCHLORIDE 1.25 MG: 1.25 SOLUTION RESPIRATORY (INHALATION) at 09:13

## 2020-01-29 RX ADMIN — PROMETHAZINE HYDROCHLORIDE AND DEXTROMETHORPHAN HYDROBROMIDE 5 ML: 15; 6.25 SYRUP ORAL at 00:16

## 2020-01-29 RX ADMIN — FUROSEMIDE 40 MG: 10 INJECTION, SOLUTION INTRAMUSCULAR; INTRAVENOUS at 18:38

## 2020-01-29 RX ADMIN — Medication 10 ML: at 11:13

## 2020-01-29 RX ADMIN — Medication 2 PUFF: at 09:14

## 2020-01-29 RX ADMIN — METOPROLOL TARTRATE 25 MG: 25 TABLET, FILM COATED ORAL at 11:07

## 2020-01-29 RX ADMIN — IPRATROPIUM BROMIDE AND ALBUTEROL SULFATE 1 AMPULE: .5; 3 SOLUTION RESPIRATORY (INHALATION) at 20:04

## 2020-01-29 RX ADMIN — LEVOFLOXACIN 750 MG: 750 TABLET, FILM COATED ORAL at 11:07

## 2020-01-29 RX ADMIN — GUAIFENESIN AND DEXTROMETHORPHAN 5 ML: 100; 10 SYRUP ORAL at 15:08

## 2020-01-29 RX ADMIN — Medication 10 ML: at 21:19

## 2020-01-29 RX ADMIN — Medication 2 PUFF: at 20:05

## 2020-01-29 ASSESSMENT — ENCOUNTER SYMPTOMS
NAUSEA: 0
BACK PAIN: 0
RHINORRHEA: 0
CONSTIPATION: 0
TROUBLE SWALLOWING: 0
SHORTNESS OF BREATH: 0
SORE THROAT: 0
ABDOMINAL PAIN: 0
WHEEZING: 0
EYE REDNESS: 0
EYE DISCHARGE: 0
DIARRHEA: 0
COUGH: 1

## 2020-01-29 ASSESSMENT — PAIN SCALES - GENERAL: PAINLEVEL_OUTOF10: 0

## 2020-01-29 NOTE — PRE SEDATION
Brief Pre-Op Note/Sedation Assessment      Ameena Rey  1970  [id number]  [id number]  9:50 AM    Planned Procedure: Cardiac Catheterization     Post Procedure Plan: Return to same level of care    Consent: I have discussed with the patient and/or the patient representative the indication, alternatives, and the possible risks and/or complications of the planned procedure and the anesthesia methods. The patient and/or patient representative appear to understand and agree to proceed. Chief Complaint: Dyspnea on Exertion      Indications for the Procedure:   CAD Presentation:  Cardiomyopathy    CCS Anginal Classification within 2 weeks:  No symptoms    NYHA Heart Failure Class within 2 weeks: Class IV - Symptoms of HF at rest, Newly Diagnosed? Yes, Heart Failure Type: Systolic    Valvular Disease: Severe Mitral Regurgitation     Functional Capacity: 4 METS       Anti- Anginal Meds within 2 weeks:   ANTI-ANGINAL MEDS: Yes: Beta Blockers and Aspirin    Stress or Imaging Studies Performed:  None    Vital Signs:  BP 95/65   Pulse 105   Temp 97.4 °F (36.3 °C) (Oral)   Resp 18   Ht 5' 10\" (1.778 m)   Wt 174 lb 6.4 oz (79.1 kg)   SpO2 95%   BMI 25.02 kg/m²     Allergies:   Allergies   Allergen Reactions    Vicodin [Hydrocodone-Acetaminophen] Hives       Past Medical History:  Past Medical History:   Diagnosis Date    Anxiety     Asthma     [facility] 6/4/2010    Depression 6/4/2010    GERD (gastroesophageal reflux disease) 4/28/2010    Hypertension     Impotence 3/31/2010    Insomnia        Surgical History:  Past Surgical History:   Procedure Laterality Date    BRONCHOSCOPY N/A 1/27/2020    BRONCHOSCOPY ALVEOLAR LAVAGE performed by Jessica Oropeza MD at [address]  1/27/2020    BRONCHOSCOPY BRUSHINGS performed by Jessica Oropeza MD at [address]       Medications:  Current Facility-Administered Medications Medication Dose Route Frequency Provider Last Rate Last Dose    lisinopril (PRINIVIL;ZESTRIL) tablet 5 mg  5 mg Oral Daily Akira Toure PA-C   5 mg at 01/28/20 1943    levofloxacin (LEVAQUIN) tablet 750 mg  750 mg Oral Daily Josiah Kan MD   750 mg at 01/28/20 1517    metoprolol tartrate (LOPRESSOR) tablet 25 mg  25 mg Oral BID Akira Toure PA-C   25 mg at 01/28/20 2207    ALPRAZolam (XANAX) tablet 0.5 mg  0.5 mg Oral TID Akira Toure PA-C   0.5 mg at 01/28/20 2208    furosemide (LASIX) injection 40 mg  40 mg Intravenous BID Alpa Tavera DO   40 mg at 01/28/20 1821    potassium chloride (KLOR-CON M) extended release tablet 40 mEq  40 mEq Oral PRN Alpa Tavera DO        Or    potassium bicarb-citric acid (EFFER-K) effervescent tablet 40 mEq  40 mEq Oral PRN Alpa Tavera DO        Or    potassium chloride 10 mEq/100 mL IVPB (Peripheral Line)  10 mEq Intravenous PRN Alpa Tavera DO        ibuprofen (ADVIL;MOTRIN) tablet 600 mg  600 mg Oral Q6H PRN Vania Pérez APRN - CNP   600 mg at 01/28/20 0501    promethazine-dextromethorphan (PROMETHAZINE-DM) 6.25-15 MG/5ML syrup 5 mL  5 mL Oral Q4H PRN Vania Ramirezistopher, APRN - CNP   5 mL at 01/29/20 0016    levalbuterol (XOPENEX) nebulizer solution 1.25 mg  1.25 mg Nebulization Q8H PRN Sergioa SULY Wallhristopher, APRN - CNP   1.25 mg at 01/29/20 0913    methylPREDNISolone sodium (SOLU-MEDROL) injection 40 mg  40 mg Intravenous Daily Lia Horta MD   40 mg at 01/28/20 0916    perflutren lipid microspheres (DEFINITY) injection 1.65 mg  1.5 mL Intravenous ONCE PRN Sergioa SULY Wallhristophclarence, APRN - CNP        albuterol sulfate  (90 Base) MCG/ACT inhaler 2 puff  2 puff Inhalation 4x Daily PRN Radha Corona MD        mometasone-formoterol (DULERA) 100-5 MCG/ACT inhaler 2 puff  2 puff Inhalation BID Jaime Horta MD   2 puff at 01/29/20 0914    pantoprazole (PROTONIX) tablet 40 mg  40 mg Oral [facility] AC Planned:  midazolam intravenously, fentanyl intravenously    Patient is an appropriate candidate for plan of sedation: yes    Preoperative Risk: Intermediate    The risks, benefits, goals, and alternatives of the procedure were discussed in detail with the patient. Informed consent was obtained and further recommendations will be made following the procedure.      Stephani Toure D.O., [facility]  Interventional Cardiology     o: [phone]  [address] [place] [address]., [address], [address] [facility]

## 2020-01-29 NOTE — PROGRESS NOTES
Cardiovascular Progress Note      Chief Complaint:   Chief Complaint   Patient presents with    Cough     Pt. comes in today with complaints of a cough. Pt. reports that he was just recently released from [facility] and being treated for pneumonia. Pt. states that he is not feeling any better. Impression/Recommendations:    Mr. Maggie Johnston is a 51 y.o. male patient     Multifocal pneumonia   Severe CMP  Acute systolic HF : LVEF 28% with severe functional MR   Intermediate 10- year ASCVD Risk 7.5%   Family history of NICMP and SCD   Hypertension   Asthma  GERD  Anxiety/Depression   IBS  Tobacco abuse        Normal coronaries by [facility] today Right Radial approach  Nonischemic Cardiomyopathy  OK to MD home with follow up with Dr. Devorah Chu, CHF specialist at The [address] [place] [address] to establish care there upon discharge as his mother sees him for NICMP as well. LifeVest arranged and uptitration of beta blocker/ ACEI with potential for Spironolactone limited by low normal blood pressure while here. Interval History:   Pt. S/E. No events overnight. IV antibiotics switched to PO by ID for atypical pneumonia. No fevers.    178 -->174 lbs     Medications:  lisinopril (PRINIVIL;ZESTRIL) tablet 5 mg, Daily  levofloxacin (LEVAQUIN) tablet 750 mg, Daily  metoprolol tartrate (LOPRESSOR) tablet 25 mg, BID  ALPRAZolam (XANAX) tablet 0.5 mg, TID  furosemide (LASIX) injection 40 mg, BID  potassium chloride (KLOR-CON M) extended release tablet 40 mEq, PRN    Or  potassium bicarb-citric acid (EFFER-K) effervescent tablet 40 mEq, PRN    Or  potassium chloride 10 mEq/100 mL IVPB (Peripheral Line), PRN  ibuprofen (ADVIL;MOTRIN) tablet 600 mg, Q6H PRN  promethazine-dextromethorphan (PROMETHAZINE-DM) 6.25-15 MG/5ML syrup 5 mL, Q4H PRN  levalbuterol (XOPENEX) nebulizer solution 1.25 mg, Q8H PRN  methylPREDNISolone sodium (SOLU-MEDROL) injection 40 mg, Daily  perflutren lipid microspheres (DEFINITY) injection

## 2020-01-29 NOTE — PROGRESS NOTES
Pt assessment completed. Pt VS taken and are WNL. Pt denies pain. Pt given scheduled medications per order except Lovenox held due to angiogram tomorrow. Pt assisted with removing bsc x2 from room and bringing in chairs per pt request.  No further needs at this time. Mom at bedside. Call light within reach.   .Electronically signed by Vicki Chow RN on 1/28/2020 at 11:49 PM

## 2020-01-29 NOTE — PROGRESS NOTES
Pt back from cath lab. VSS and mother at bedside. Denies pain or any other needs. Call light in reach. Cath site CDI and strong pulses.

## 2020-01-29 NOTE — PROGRESS NOTES
[address] [place] [address] PROGRESS NOTE    2020 7:21 PM        Name: Anuel Aviles . Admitted: 2020  Primary Care Provider: Sherif Mtz MD (Tel: [phone])      Subjective:  . Feeling better today, SOB improving. ECHO revealed EF of 20%. Dad  in his 46s from sudden cardiac death. Brother  at 32 from the same cause. Prior to current illness, he denied fatigue, SOB, recent health changes.      Reviewed interval ancillary notes    Current Medications  lisinopril (PRINIVIL;ZESTRIL) tablet 5 mg, Daily  levofloxacin (LEVAQUIN) tablet 750 mg, Daily  metoprolol tartrate (LOPRESSOR) tablet 25 mg, BID  ALPRAZolam (XANAX) tablet 0.5 mg, TID  furosemide (LASIX) injection 40 mg, BID  potassium chloride (KLOR-CON M) extended release tablet 40 mEq, PRN    Or  potassium bicarb-citric acid (EFFER-K) effervescent tablet 40 mEq, PRN    Or  potassium chloride 10 mEq/100 mL IVPB (Peripheral Line), PRN  ibuprofen (ADVIL;MOTRIN) tablet 600 mg, Q6H PRN  promethazine-dextromethorphan (PROMETHAZINE-DM) 6.25-15 MG/5ML syrup 5 mL, Q4H PRN  levalbuterol (XOPENEX) nebulizer solution 1.25 mg, Q8H PRN  methylPREDNISolone sodium (SOLU-MEDROL) injection 40 mg, Daily  perflutren lipid microspheres (DEFINITY) injection 1.65 mg, ONCE PRN  albuterol sulfate  (90 Base) MCG/ACT inhaler 2 puff, 4x Daily PRN  mometasone-formoterol (DULERA) 100-5 MCG/ACT inhaler 2 puff, BID  pantoprazole (PROTONIX) tablet 40 mg, QAM AC  sertraline (ZOLOFT) tablet 50 mg, Daily  sodium chloride flush 0.9 % injection 10 mL, 2 times per day  sodium chloride flush 0.9 % injection 10 mL, PRN  magnesium hydroxide (MILK OF MAGNESIA) 400 MG/5ML suspension 30 mL, Daily PRN  ondansetron (ZOFRAN) injection 4 mg, Q6H PRN  enoxaparin (LOVENOX) injection 40 mg, Nightly  guaiFENesin-dextromethorphan (ROBITUSSIN DM) 100-10 MG/5ML syrup 5 mL, Q4H PRN  ipratropium-albuterol (DUONEB) nebulizer solution 1 ampule, Q4H WA        Objective:  /64   Pulse 113   Temp 98.4 °F (36.9 °C)   Resp 18   Ht 5' 10\" (1.778 m)   Wt 178 lb 4.8 oz (80.9 kg)   SpO2 95%   BMI 25.58 kg/m²     Intake/Output Summary (Last 24 hours) at 1/28/2020 1921  Last data filed at 1/28/2020 1307  Gross per 24 hour   Intake 480 ml   Output --   Net 480 ml      Wt Readings from Last 3 Encounters:   01/28/20 178 lb 4.8 oz (80.9 kg)   01/16/20 173 lb 8 oz (78.7 kg)   01/15/20 175 lb (79.4 kg)       General appearance:  Appears uncomfortable, he is coughing often, dry non productive   Eyes: Sclera clear. Pupils equal.  ENT: Moist oral mucosa. Trachea midline, no adenopathy. Cardiovascular: Regular rhythm, normal S1, S2. No murmur. + 1 edema in lower extremities  Respiratory: Not using accessory muscles. Crackles noted in bases,  Dry cough present  GI: Abdomen soft, no tenderness, not distended, normal bowel sounds  Musculoskeletal: No cyanosis in digits, neck supple  Neurology: CN 2-12 grossly intact. No speech or motor deficits  Psych: Normal affect. Alert and oriented in time, place and person  Skin: Warm, dry, normal turgor    Labs and Tests:  CBC:   No results for input(s): WBC, HGB, PLT in the last 72 hours. BMP:    Recent Labs     01/28/20  0637      K 4.9      CO2 23   BUN 28*   CREATININE 1.0   GLUCOSE 160*     Hepatic:   No results for input(s): AST, ALT, ALB, BILITOT, ALKPHOS in the last 72 hours. CT 1/26/2020    1. No evidence for acute pulmonary embolism. 2. Interval progression of extensive multifocal pneumonia in the right upper  and middle lobe with new areas in the left upper and lower lobes indicating  worsening pneumonia. 3. Stable moderate right pleural effusion. 4. There is trace right pleural effusion significantly improved from prior. Subsegmental atelectasis posterior left lung base on prior also resolved.       Doppler 1/24/2020   Negative for any DVT in both legs ECHO  -Left ventricular cavity size is severely dilated. Normal left ventricular   wall thickness.   - LV dysfunction is severely depressed with ejection fraction estimated to   be 20% with severe global hypokinesis. Grade III diastolic dysfunction with   elevated LV filling pressures.   -Severe mitral regurgitation.   -The left atrium is dilated. -Moderate tricuspid regurgitation with PASP of 40 mmHg. -The right atrial size is dilated. Problem List  Active Problems:    Multifocal pneumonia    Acute respiratory failure with hypoxia (HCC)    EKG abnormalities    Peripheral edema    Nonischemic cardiomyopathy (HCC)    Smoker    Essential hypertension  Resolved Problems:    * No resolved hospital problems. *       Assessment & Plan:   1. Worsening PNA:  On cefepime, vanc. Pulmonary on board. ? Atypical pneumonia. No pulmonary hemorrhage. ID has been consulted. 2.  Decompensated Systolic CHF-new diagnosis. EF 20%. Cardiology on board. Getting IV lasix. Will need cardiac cath pending ID evaluation. Will need life vest at discharge. 3.  Tachycardia:  CTA negative on 1/16 for PE,  TSH is in range. This seems to be chronic. Now on b blocker. 4.  HTN: stable. Now on lisinopril and b blocker due to chf.   5.  Chronic anxiety/ depression:-he takes scheduled xanax tid at home. Will make this scheduled.        Diet: DIET GENERAL; No Added Salt (3-4 GM)  Code:Full Code  DVT PPX: gricelda Cohen PA-C   1/28/2020 7:21 PM

## 2020-01-29 NOTE — PROGRESS NOTES
°F (36.4 °C)   Resp 18   Ht 5' 10\" (1.778 m)   Wt 174 lb 6.4 oz (79.1 kg)   SpO2 94%   BMI 25.02 kg/m²     Intake/Output Summary (Last 24 hours) at 1/29/2020 1320  Last data filed at 1/28/2020 1930  Gross per 24 hour   Intake 432 ml   Output --   Net 432 ml      Wt Readings from Last 3 Encounters:   01/29/20 174 lb 6.4 oz (79.1 kg)   01/16/20 173 lb 8 oz (78.7 kg)   01/15/20 175 lb (79.4 kg)       General appearance:  Appears uncomfortable, he is coughing often, dry non productive   Eyes: Sclera clear. Pupils equal.  ENT: Moist oral mucosa. Trachea midline, no adenopathy. Cardiovascular: Regular rhythm, normal S1, S2. No murmur. + 1 edema in lower extremities  Respiratory: Not using accessory muscles. Crackles noted in bases,  Dry cough present  GI: Abdomen soft, no tenderness, not distended, normal bowel sounds  Musculoskeletal: No cyanosis in digits, neck supple  Neurology: CN 2-12 grossly intact. No speech or motor deficits  Psych: Normal affect. Alert and oriented in time, place and person  Skin: Warm, dry, normal turgor    Labs and Tests:  CBC:   Recent Labs     01/29/20  0741   WBC 17.4*   HGB 10.9*        BMP:    Recent Labs     01/28/20  0637 01/29/20  0742    138   K 4.9 4.5    101   CO2 23 25   BUN 28* 28*   CREATININE 1.0 1.0   GLUCOSE 160* 142*     Hepatic:   No results for input(s): AST, ALT, ALB, BILITOT, ALKPHOS in the last 72 hours. CT 1/26/2020    1. No evidence for acute pulmonary embolism. 2. Interval progression of extensive multifocal pneumonia in the right upper  and middle lobe with new areas in the left upper and lower lobes indicating  worsening pneumonia. 3. Stable moderate right pleural effusion. 4. There is trace right pleural effusion significantly improved from prior. Subsegmental atelectasis posterior left lung base on prior also resolved.       Doppler 1/24/2020   Negative for any DVT in both legs     ECHO  -Left ventricular cavity size is severely

## 2020-01-29 NOTE — PROGRESS NOTES
for like new rashes, lip swelling, severe reaction, worsening diarrhea, break through fever etc.  · Discussed patient's condition and what to expect. All of the patient's questions were addressed in a satisfactory manner and patient verbalized understanding all instructions. Level of complexity of visit: High     Fluoroquinolone related instructions:     Patient instructed to watch for low or high blood sugars, muscle pains and ankle tendon pain while on Ciprofloxacin or Levofloxacin. Patient was advised to keep a sugar candy at all times as all fluoroquinolones have the potential of causing hypoglycemia. If these symptoms develops, patient was instructed to stop the antibiotic and call my office at [phone]. Use sunscreen when going in bright sun while on Ciprofloxacin or Levofloxacin as these antibiotics can cause photosensitivity. Take 1 hour before or 2 hour after dairy, calcium, iron, magnesium, aluminum or zinc.      TIME SPENT TODAY:     - Spent over  35  minutes on visit (including interval history, physical exam, review of data including labs, cultures, imaging, development and implementation of treatment plan and coordination of complex care). - Over 50% of time spent with patient face to face on counseling and education. Thank you for involving me in the care of your patient. I will continue to follow. If you have any additional questions, please do not hesitate to contact me. Subjective: Interval history: Patient was seen and examined at bedside. Interval history was obtained. The patient is afebrile. He is on Levaquin. He is tolerating it okay. Had a cardiac cath today. REVIEW OF SYSTEMS:    Review of Systems   Constitutional: Positive for fatigue. Negative for chills, diaphoresis and fever. HENT: Negative for ear discharge, ear pain, rhinorrhea, sore throat and trouble swallowing. Eyes: Negative for discharge and redness. Respiratory: Positive for cough.  Negative for shortness of breath and wheezing. Cardiovascular: Negative for chest pain and leg swelling. Gastrointestinal: Negative for abdominal pain, constipation, diarrhea and nausea. Endocrine: Negative for polyuria. Genitourinary: Negative for dysuria, flank pain, frequency, hematuria and urgency. Musculoskeletal: Negative for back pain and myalgias. Skin: Negative for rash. Neurological: Negative for dizziness, seizures and headaches. Hematological: Does not bruise/bleed easily. Psychiatric/Behavioral: Negative for hallucinations and suicidal ideas. All other systems reviewed and are negative. Past Medical History: All past medical history reviewed today. Past Medical History:   Diagnosis Date    Anxiety     Asthma     Bipolar affective disorder ([facility] [address] 6/4/2010    Depression 6/4/2010    GERD (gastroesophageal reflux disease) 4/28/2010    Hypertension     Impotence 3/31/2010    Insomnia        Past Surgical History: All past surgical history was reviewed today. Past Surgical History:   Procedure Laterality Date    BRONCHOSCOPY N/A 1/27/2020    BRONCHOSCOPY ALVEOLAR LAVAGE performed by Verónica Benton MD at [address] [place] [address]  1/27/2020    BRONCHOSCOPY BRUSHINGS performed by Verónica Benton MD at [address] [place] [address]       Family History: All family history was reviewed today. History reviewed. No pertinent family history. Objective:       PHYSICAL EXAM:      Vitals:   Vitals:    01/29/20 0534 01/29/20 0600 01/29/20 0917 01/29/20 1045   BP:    93/67   Pulse:  105  110   Resp:   18 18   Temp:    97.5 °F (36.4 °C)   TempSrc:       SpO2:   95% 94%   Weight: 174 lb 6.4 oz (79.1 kg)      Height:           Physical Exam  Vitals signs and nursing note reviewed. Constitutional:       Appearance: Normal appearance. He is well-developed. HENT:      Head: Normocephalic and atraumatic.       Right Ear: External ear normal.      Left Ear: External ear me.    CBC:  Recent Labs     01/29/20  0741   WBC 17.4*   RBC 3.48*   HGB 10.9*   HCT 33.8*      MCV 97.2   MCH 31.3   MCHC 32.2   RDW 14.1        BMP:  Recent Labs     01/28/20  0637 01/29/20  0742    138   K 4.9 4.5    101   CO2 23 25   BUN 28* 28*   CREATININE 1.0 1.0   CALCIUM 8.6 8.7   GLUCOSE 160* 142*        Hepatic Function Panel:   Lab Results   Component Value Date    ALKPHOS 68 01/24/2020    ALT 51 01/24/2020    AST 21 01/24/2020    PROT 5.9 01/24/2020    BILITOT 0.6 01/24/2020    LABALBU 3.0 01/25/2020       CPK: No results found for: CKTOTAL  ESR:   Lab Results   Component Value Date    SEDRATE 76 (H) 01/26/2020     CRP: No results found for: CRP        Imaging: All pertinent images and reports for the current visit were reviewed by me during this visit. CT CHEST PULMONARY EMBOLISM W CONTRAST   Final Result   1. No evidence for acute pulmonary embolism. 2. Interval progression of extensive multifocal pneumonia in the right upper   and middle lobe with new areas in the left upper and lower lobes indicating   worsening pneumonia. 3. Stable moderate right pleural effusion. 4. There is trace right pleural effusion significantly improved from prior. Subsegmental atelectasis posterior left lung base on prior also resolved. VL Extremity Venous Bilateral   Final Result      XR CHEST STANDARD (2 VW)   Final Result   Scattered airspace opacities concerning for pneumonia that is worse compared   to prior examination. Medications: All current and past medications were reviewed.      metoprolol succinate  50 mg Oral Nightly    lisinopril  5 mg Oral Daily    levofloxacin  750 mg Oral Daily    ALPRAZolam  0.5 mg Oral TID    furosemide  40 mg Intravenous BID    methylPREDNISolone  40 mg Intravenous Daily    mometasone-formoterol  2 puff Inhalation BID    pantoprazole  40 mg Oral QAM AC    sertraline  50 mg Oral Daily    sodium chloride flush  10 mL Intravenous 2 times per day    enoxaparin  40 mg Subcutaneous Nightly    ipratropium-albuterol  1 ampule Inhalation Q4H WA           potassium chloride **OR** potassium alternative oral replacement **OR** potassium chloride, ibuprofen, promethazine-dextromethorphan, levalbuterol, perflutren lipid microspheres, albuterol sulfate HFA, sodium chloride flush, magnesium hydroxide, ondansetron, guaiFENesin-dextromethorphan      Problem list:       Patient Active Problem List   Diagnosis Code    Anxiety F41.9    Asthma J45.909    Bipolar affective disorder ([facility] [address] F31.9    Depression F32.9    Erectile dysfunction N52.9    Multifocal pneumonia J18.9    Acute respiratory failure with hypoxia (HCC) J96.01    EKG abnormalities R94.31    Peripheral edema R60.9    [facility] I42.9    Smoker F17.200    Essential hypertension C06    Acute systolic heart failure (HCC) I50.21       Please note that this chart was generated using Dragon dictation software. Although every effort was made to ensure the accuracy of this automated transcription, some errors in transcription may have occurred inadvertently. If you may need any clarification, please do not hesitate to contact me through EPIC or at the phone number provided below with my electronic signature. Any pictures or media included in this note were obtained after taking informed verbal consent from the patient and with their approval to include those in the patient's medical record.     Luciana Rivas MD, MPH  1/29/2020 , 1:29 PM   [facility] Infectious Disease   Office: [phone]  Fax: [phone]  Tuesday AM clinic:   [address] [address]  Thursday AM [id number] [facility]

## 2020-01-30 VITALS
TEMPERATURE: 98.4 F | OXYGEN SATURATION: 92 % | DIASTOLIC BLOOD PRESSURE: 68 MMHG | RESPIRATION RATE: 17 BRPM | SYSTOLIC BLOOD PRESSURE: 102 MMHG | BODY MASS INDEX: 24.91 KG/M2 | WEIGHT: 174 LBS | HEIGHT: 70 IN | HEART RATE: 108 BPM

## 2020-01-30 LAB
ANCA IFA: NORMAL
ANION GAP SERPL CALCULATED.3IONS-SCNC: 11 MMOL/L (ref 3–16)
BUN BLDV-MCNC: 28 MG/DL (ref 7–20)
CALCIUM SERPL-MCNC: 8.9 MG/DL (ref 8.3–10.6)
CHLAMYDIA PNEUMONIAE BY PCR: NOT DETECTED
CHLAMYDIA PNEUMONIAE SOURCE: NORMAL
CHLORIDE BLD-SCNC: 99 MMOL/L (ref 99–110)
CMV DNA QNT PCR: <2.6 LOG CPY/ML
CMV DNA QUANTATATIVE INTERPRETATION: <2.4 LOG IU/ML
CMV DNA QUANTATATIVE INTERPRETATION: NOT DETECTED
CMV DNA QUANTITATIVE: <227 IU/ML
CMV SOURCE: NORMAL
CMVQ COPY/ML: <390 CPY/ML
CO2: 26 MMOL/L (ref 21–32)
CREAT SERPL-MCNC: 0.8 MG/DL (ref 0.9–1.3)
GFR AFRICAN AMERICAN: >60
GFR NON-AFRICAN AMERICAN: >60
GLUCOSE BLD-MCNC: 182 MG/DL (ref 70–99)
LEGIONELLA PNEUMOPHILIA BY PCR: NOT DETECTED
LEGIONELLA SOURCE: NORMAL
LEGIONELLA SPECIES PCR: NOT DETECTED
MYCOPLASMA PNEUMO SOURCE: NORMAL
MYCOPLASMA PNEUMONIAE PCR: NOT DETECTED
POTASSIUM SERPL-SCNC: 4.6 MMOL/L (ref 3.5–5.1)
SODIUM BLD-SCNC: 136 MMOL/L (ref 136–145)

## 2020-01-30 PROCEDURE — 99232 SBSQ HOSP IP/OBS MODERATE 35: CPT | Performed by: INTERNAL MEDICINE

## 2020-01-30 PROCEDURE — 6360000002 HC RX W HCPCS: Performed by: INTERNAL MEDICINE

## 2020-01-30 PROCEDURE — 36415 COLL VENOUS BLD VENIPUNCTURE: CPT

## 2020-01-30 PROCEDURE — 6370000000 HC RX 637 (ALT 250 FOR IP): Performed by: PHYSICIAN ASSISTANT

## 2020-01-30 PROCEDURE — 6370000000 HC RX 637 (ALT 250 FOR IP): Performed by: HOSPITALIST

## 2020-01-30 PROCEDURE — 2580000003 HC RX 258: Performed by: INTERNAL MEDICINE

## 2020-01-30 PROCEDURE — 6370000000 HC RX 637 (ALT 250 FOR IP): Performed by: INTERNAL MEDICINE

## 2020-01-30 PROCEDURE — 6370000000 HC RX 637 (ALT 250 FOR IP): Performed by: NURSE PRACTITIONER

## 2020-01-30 PROCEDURE — 80048 BASIC METABOLIC PNL TOTAL CA: CPT

## 2020-01-30 RX ORDER — DEXTROMETHORPHAN HYDROBROMIDE AND PROMETHAZINE HYDROCHLORIDE 15; 6.25 MG/5ML; MG/5ML
5 SYRUP ORAL EVERY 4 HOURS PRN
Qty: 180 ML | Refills: 0 | Status: SHIPPED | OUTPATIENT
Start: 2020-01-30 | End: 2020-02-06

## 2020-01-30 RX ORDER — AMOXICILLIN AND CLAVULANATE POTASSIUM 875; 125 MG/1; MG/1
1 TABLET, FILM COATED ORAL EVERY 12 HOURS SCHEDULED
Qty: 14 TABLET | Refills: 0 | Status: SHIPPED | OUTPATIENT
Start: 2020-01-30 | End: 2020-02-06

## 2020-01-30 RX ORDER — METOPROLOL SUCCINATE 50 MG/1
50 TABLET, EXTENDED RELEASE ORAL NIGHTLY
Qty: 30 TABLET | Refills: 1 | Status: SHIPPED | OUTPATIENT
Start: 2020-01-30

## 2020-01-30 RX ORDER — LISINOPRIL 5 MG/1
5 TABLET ORAL DAILY
Qty: 30 TABLET | Refills: 1 | Status: SHIPPED | OUTPATIENT
Start: 2020-01-31

## 2020-01-30 RX ORDER — AMOXICILLIN AND CLAVULANATE POTASSIUM 875; 125 MG/1; MG/1
1 TABLET, FILM COATED ORAL EVERY 12 HOURS SCHEDULED
Status: DISCONTINUED | OUTPATIENT
Start: 2020-01-30 | End: 2020-01-30 | Stop reason: HOSPADM

## 2020-01-30 RX ADMIN — PANTOPRAZOLE SODIUM 40 MG: 40 TABLET, DELAYED RELEASE ORAL at 07:15

## 2020-01-30 RX ADMIN — Medication 10 ML: at 08:34

## 2020-01-30 RX ADMIN — PROMETHAZINE HYDROCHLORIDE AND DEXTROMETHORPHAN HYDROBROMIDE 5 ML: 15; 6.25 SYRUP ORAL at 03:50

## 2020-01-30 RX ADMIN — LEVOFLOXACIN 750 MG: 750 TABLET, FILM COATED ORAL at 08:34

## 2020-01-30 RX ADMIN — FUROSEMIDE 40 MG: 10 INJECTION, SOLUTION INTRAMUSCULAR; INTRAVENOUS at 08:34

## 2020-01-30 RX ADMIN — ALPRAZOLAM 0.5 MG: 0.5 TABLET ORAL at 13:43

## 2020-01-30 RX ADMIN — METHYLPREDNISOLONE SODIUM SUCCINATE 40 MG: 40 INJECTION, POWDER, FOR SOLUTION INTRAMUSCULAR; INTRAVENOUS at 08:34

## 2020-01-30 RX ADMIN — ALPRAZOLAM 0.5 MG: 0.5 TABLET ORAL at 08:34

## 2020-01-30 RX ADMIN — LISINOPRIL 5 MG: 5 TABLET ORAL at 08:34

## 2020-01-30 ASSESSMENT — ENCOUNTER SYMPTOMS
EYE DISCHARGE: 0
ABDOMINAL PAIN: 0
BACK PAIN: 0
RHINORRHEA: 0
COUGH: 1
SHORTNESS OF BREATH: 0
WHEEZING: 0
EYE REDNESS: 0
TROUBLE SWALLOWING: 0
CONSTIPATION: 0
DIARRHEA: 0
SORE THROAT: 0
NAUSEA: 0

## 2020-01-30 ASSESSMENT — PAIN SCALES - GENERAL
PAINLEVEL_OUTOF10: 0
PAINLEVEL_OUTOF10: 0

## 2020-01-30 NOTE — PLAN OF CARE
Problem: Discharge Planning:  Goal: Discharged to appropriate level of care  Description  Discharged to appropriate level of care  Outcome: Ongoing  Note:   Anticipate discharge to home at discharge; may benefit from [address] F/U; awaiting Lifevest and Holter Monitor; instructed on plan to check RA sats today to evaluate potential need for home O2     Problem: Airway Clearance - Ineffective:  Goal: Clear lung sounds  Description  Clear lung sounds  Outcome: Ongoing  Note:   Breath sounds diminished Rt base, but otherwise clear with DB effort     Problem: Gas Exchange - Impaired:  Goal: Levels of oxygenation will improve  Description  Levels of oxygenation will improve  Outcome: Ongoing  Note:   O2 sat 96% on 2L via NC - instructed on plan to check RA sats at rest and with exertion today     Problem: Tobacco Use:  Goal: Will participate in inpatient tobacco-use cessation counseling  Description  Will participate in inpatient tobacco-use cessation counseling  Outcome: Ongoing  Note:   Pt states he smokes \"socially\"; instructed on potential benefits of smoking cessation     Problem: Pain:  Goal: Pain level will decrease  Description  Pain level will decrease  Outcome: Ongoing  Note:   Denies pain; affirms awareness to report onset of pain to staff

## 2020-01-30 NOTE — PROGRESS NOTES
[facility] Pulmonary/CCM Progress note      Admit Date: 1/24/2020    Chief Complaint: Cough and shortness of breath    Subjective: Interval History: Dyspnea is better, still has leg edema. Still has a nagging cough with no phlegm. Currently on room air. Denies any chest pain, no fevers noted.     Scheduled Meds:   metoprolol succinate  50 mg Oral Nightly    sodium chloride flush  10 mL Intravenous 2 times per day    lisinopril  5 mg Oral Daily    levofloxacin  750 mg Oral Daily    ALPRAZolam  0.5 mg Oral TID    furosemide  40 mg Intravenous BID    methylPREDNISolone  40 mg Intravenous Daily    mometasone-formoterol  2 puff Inhalation BID    pantoprazole  40 mg Oral QAM AC    sertraline  50 mg Oral Daily    sodium chloride flush  10 mL Intravenous 2 times per day    enoxaparin  40 mg Subcutaneous Nightly    ipratropium-albuterol  1 ampule Inhalation Q4H WA     Continuous Infusions:  PRN Meds:sodium chloride flush, potassium chloride **OR** potassium alternative oral replacement **OR** potassium chloride, ibuprofen, promethazine-dextromethorphan, levalbuterol, perflutren lipid microspheres, albuterol sulfate HFA, sodium chloride flush, magnesium hydroxide, ondansetron, guaiFENesin-dextromethorphan    Review of Systems  Constitutional: negative for fatigue, fevers, malaise and weight loss  Ears, nose, mouth, throat: negative for ear drainage, epistaxis, hoarseness, nasal congestion, sore throat and voice change  Respiratory: negative except for cough and shortness of breath  Cardiovascular: negative for chest pain, chest pressure/discomfort, irregular heart beat, lower extremity edema and palpitations  Gastrointestinal: negative for abdominal pain, constipation, diarrhea, jaundice, melena, odynophagia, reflux symptoms and vomiting  Hematologic/lymphatic: negative for bleeding, easy bruising, lymphadenopathy and petechiae  Musculoskeletal:negative for arthralgias, bone pain, muscle weakness, neck pain and stiff joints  Neurological: negative for dizziness, gait problems, headaches, seizures, speech problems, tremors and weakness  Behavioral/Psych: negative for anxiety, behavior problems, depression, fatigue and sleep disturbance  Endocrine: negative for diabetic symptoms including none, neuropathy, polyphagia, polyuria, polydipsia, vomiting and diarrhea and temperature intolerance  Allergic/Immunologic: negative for anaphylaxis, angioedema, hay fever and urticaria    Objective:     Patient Vitals for the past 8 hrs:   BP Temp Temp src Pulse Resp SpO2   01/30/20 0810 104/71 97.3 °F (36.3 °C) Temporal 100 17 96 %   01/30/20 0411 -- -- -- 106 -- --     No intake/output data recorded.   I/O this shift:  In: 360 [P.O.:360]  Out: -     General Appearance: alert and oriented to person, place and time, well developed and well- nourished, in no acute distress  Skin: warm and dry, no rash or erythema  Head: normocephalic and atraumatic  Eyes: pupils equal, round, and reactive to light, extraocular eye movements intact, conjunctivae normal  ENT: external ear and ear canal normal bilaterally, nose without deformity, nasal mucosa and turbinates normal  Neck: supple and non-tender without mass, no cervical lymphadenopathy  Pulmonary/Chest: rales present-bilateral, coarse  Cardiovascular: normal rate, regular rhythm,  no murmurs, rubs, distal pulses intact, no carotid bruits  Abdomen: soft, non-tender, non-distended, normal bowel sounds, no masses or organomegaly  Lymph Nodes: Cervical, supraclavicular normal  Extremities: no cyanosis, clubbing or edema  Musculoskeletal: normal range of motion, no joint swelling, deformity or tenderness  Neurologic: alert, no focal neurologic deficits    Data Review:  CBC:   Lab Results   Component Value Date    WBC 17.4 01/29/2020    RBC 3.48 01/29/2020     BMP:   Lab Results   Component Value Date    GLUCOSE 182 01/30/2020    CO2 26 01/30/2020    BUN 28 01/30/2020    CREATININE 0.8 01/30/2020 indicate worsening multifocal pneumonia.       No pneumothorax.       Upper Abdomen: Limited images of the upper abdomen are unremarkable.  Some   reflux of contrast into the IVC and hepatic veins may be related to rate of   IV contrast injection.       Soft Tissues/Bones: No acute bone or soft tissue abnormality.           Impression   1. No evidence for acute pulmonary embolism. 2. Interval progression of extensive multifocal pneumonia in the right upper   and middle lobe with new areas in the left upper and lower lobes indicating   worsening pneumonia. 3. Stable moderate right pleural effusion. 4. There is trace right pleural effusion significantly improved from prior. Subsegmental atelectasis posterior left lung base on prior also resolved. Problem List:   Severe systolic cardiomyopathy/MR  Multifocal pneumonia  Acute hypoxic respiratory failure  Right pleural effusion    Assessment/Plan:     Severe dilated/systolic cardiomyopathy with MR. Coronary angiogram done yesterday, normal coronaries. Patient has strong family history of nonischemic cardiomyopathy. Will continue follow-up with Dr. Krupa Menchaca at [facility].  Patient will require cardiac meds to be optimized prior to discharge. Bronchoscopy performed on 1/27, all cultures negative. Autoimmune work-up negative, ANCA awaited. Can switch antibiotics to oral Augmentin to complete a total 14-day course. Okay for discharge from pulmonary standpoint. Will organize for outpatient follow-up with me in 2 to 3 weeks.     Ramakrishna Rosales MD

## 2020-01-30 NOTE — PROGRESS NOTES
Pt off of supplemental O2, at rest, most of AM, and states no dyspnea or resp distress; VSS; instructed on plan for and purpose of impending home O2 eval; mom at bedside; awaiting Cardiology. Merilee Shone monitor.

## 2020-01-30 NOTE — PROGRESS NOTES
PM assessment completed. Pt's right radial site is dry and intact. No sign of a hematoma, radial pulse is + and hand is warm and dry. Pt denying chest pain at this time. Pt instructed to notify RN if and when he has any bleeding, swelling or pain to right radial site or chest pain. Pt states he understands. Call light within reach, family at the bedside.

## 2020-01-30 NOTE — DISCHARGE SUMMARY
[facility] DISCHARGE SUMMARY    Patient Demographics    PatientJabari Momin  Date of Birth. 1970  MRN. [id number]     Primary care provider. Evelyn Connor MD  (Tel: [phone])    Admit date: 1/24/2020    Discharge date (blank if same as Note Date): Note Date: 1/30/2020     Reason for Hospitalization. Chief Complaint   Patient presents with    Cough     Pt. comes in today with complaints of a cough. Pt. reports that he was just recently released from [facility] and being treated for pneumonia. Pt. states that he is not feeling any better. Significant Findings. Active Problems:  Extensive bilateral pneumonia. Acute respiratory failure. Acute systolic congestive heart failure-new diagnosis  Nonischemic cardiomyopathy. Essential hypertension  Sepsis-POA  Problems and results from this hospitalization that need follow up. 1. Congestive heart failure  2. Pneumonia    Significant test results and incidental findings. 1. CT chest  1. No evidence for acute pulmonary embolism. 2. Interval progression of extensive multifocal pneumonia in the right upper   and middle lobe with new areas in the left upper and lower lobes indicating   worsening pneumonia. 3. Stable moderate right pleural effusion. 4. There is trace right pleural effusion significantly improved from prior. Subsegmental atelectasis posterior left lung base on prior also resolved. Invasive procedures and treatments. 1. Bronchoscopy  2. Left heart cardiac catheterization    [facility] Course. Patient is a pleasant 60-year-old -American male who presented to the hospital with a several week history of cough and myalgias. He was recently admitted to [facility]. for the same symptoms and treated for pneumonia and discharged.   Once his antibiotics were completed his symptoms worsened Respiratory. Not using accessory muscles. Clear to auscultation bilaterally, no wheeze. Gastrointestinal. Abdomen soft, non-tender, not distended, normal bowel sounds  Neurology. Facial symmetry. No speech deficits. Moving all extremities equally. Extremities. No edema in lower extremities. Skin. Warm, dry, normal turgor    Condition at time of discharge stable    Medication instructions provided to patient at discharge. Medication List      START taking these medications    amoxicillin-clavulanate 875-125 MG per tablet  Commonly known as:  AUGMENTIN  Take 1 tablet by mouth every 12 hours for 7 days     lisinopril 5 MG tablet  Commonly known as:  PRINIVIL;ZESTRIL  Take 1 tablet by mouth daily  Start taking on:  January 31, 2020     metoprolol succinate 50 MG extended release tablet  Commonly known as:  TOPROL XL  Take 1 tablet by mouth nightly     promethazine-dextromethorphan 6.25-15 MG/5ML syrup  Commonly known as:  PROMETHAZINE-DM  Take 5 mLs by mouth every 4 hours as needed for Cough        CONTINUE taking these medications    albuterol sulfate  (90 Base) MCG/ACT inhaler  Inhale 2 puffs into the lungs 4 times daily as needed for Wheezing  Notes to patient:  Use:bronchodilator, to open airways, rescue inhaler  Side effects: nervousness, jitteriness, shakiness, headache, fast heartbeat     ALPRAZolam 0.5 MG tablet  Commonly known as:  Jcarlos Puckett  Notes to patient:  Use: eases anxiety and calms the nerves  Side effects: feeling lightheaded, sleepy, having blurred eyesight, confusion     budesonide-formoterol 160-4.5 MCG/ACT Aero  Commonly known as:  Symbicort  Inhale 2 puffs into the lungs 2 times daily  Notes to patient:  Use: to treat asthma or COPD  Side effects: runny nose, sore throat, headache, oral yeast infection     guaiFENesin 100 MG/5ML syrup  Commonly known as:   Altarussin  Take 10 mLs by mouth every 4 hours as needed for Cough  Notes to patient:  USE: aides in management of congestion and

## 2020-01-30 NOTE — PROGRESS NOTES
Ambulated >200 ft in hallway, on RA (with O2 available if needed); O2 sat remained 91-93%, with momentary decline to 89-90% while conversing with ambulation; HR elevated from 89 to peak of 109; no dyspnea or distress observed.

## 2020-01-30 NOTE — PROGRESS NOTES
Discharge instructions reviewed with pt and mom, and understood; no HHC needs evident (pt is not homebound); LifeVest in place; no other DME needs evident; discharged to home via w/c with mom.

## 2020-01-30 NOTE — CARE COORDINATION
Patient discharged 1/30/2020 to home   All discharge needs met per case management  Lisa Nuñez RN, BSN  [phone]

## 2020-01-30 NOTE — PROGRESS NOTES
Infectious Diseases   Progress Note      Admission Date: 1/24/2020  Hospital Day: Hospital Day: 7   Attending: Sonya Lin MD  Date of service: 1/30/2020     Chief complaint/ Reason for consult: The patient was seen today for the following:    · Multifocal pneumonia  · Nonischemic cardiomyopathy  · Asthma    Microbiology:        I have reviewed allavailable micro lab data and cultures    · Blood culture (2/2) - collected on 1/24/2020: Negative  · Body fluid (BAL ) culture - collectedon 1/27/2020: Negative  · Urine Legionella pneumococcal antigen- collected on 1/28/2020: Negative      Antibiotics and immunizations:       Current antibiotics: All antibiotics and their doses were reviewed by me    Recent Abx Admin                   levofloxacin (LEVAQUIN) tablet 750 mg (mg) 750 mg Given 01/30/20 2905                  Immunization History: All immunization history was reviewed by me today. Immunization History   Administered Date(s) Administered    Influenza 01/29/2013       Known drug allergies: All allergies were reviewed and updated    Allergies   Allergen Reactions    Vicodin [Hydrocodone-Acetaminophen] Hives       Social history:     Social History:  All social andepidemiologic history was reviewed and updated by me today as needed. · Tobacco use:   reports that he has been smoking. He has never used smokeless tobacco.  · Alcohol use:   reports current alcohol use. · Currently lives in: [facility]  ·  reports no history of drug use. Assessment:     The patient is a 51 y.o. old male who  has a past medical history of Anxiety, Asthma, Bipolar affective disorder ([facility] [address] (6/4/2010), Depression (6/4/2010), GERD (gastroesophageal reflux disease) (4/28/2010), Hypertension, Impotence (3/31/2010), and Insomnia.  with following problems:    · Multifocal pneumonia-likely atypical versus viral  · Nonischemic cardiomyopathy  · Asthma  · Negative ROSMERY and RA factor in the past  · Essential cessation/ Tobacco use disorder counseling:    I have counseled the patient extensively about risks of smoking and have encouraged the patient to maintain a healthy smoke-free lifestyle. Information was given about various smoking cessation programs and their websites like www.smokefree.gov, [place]. quitlogix. org as well as help lines like 1-800-QUIT-NOW and 1-800-LUNG-[place]. TIME SPENT TODAY:     - Spent over 25  minutes on visit (including interval history, physical exam, review of data including labs, cultures, imaging, development and implementation of treatment plan and coordination of complex care). - Over 50% of time spent with patient face to face on counseling and education. Thanks for allowing me to participate in your patient's care. I will sign off today, but will be available to answer any further questions or concerns that may arise during patient's stay in the hospital.        Subjective: Interval history: Patient was seen and examined at bedside. Interval history was obtained. He is afebrile. Has ongoing coughing. No sputum production     REVIEW OF SYSTEMS:    Review of Systems   Constitutional: Positive for fatigue. Negative for chills, diaphoresis and fever. HENT: Negative for ear discharge, ear pain, rhinorrhea, sore throat and trouble swallowing. Eyes: Negative for discharge and redness. Respiratory: Positive for cough. Negative for shortness of breath and wheezing. Cardiovascular: Negative for chest pain and leg swelling. Gastrointestinal: Negative for abdominal pain, constipation, diarrhea and nausea. Endocrine: Negative for polyuria. Genitourinary: Negative for dysuria, flank pain, frequency, hematuria and urgency. Musculoskeletal: Negative for back pain and myalgias. Skin: Negative for rash. Neurological: Negative for dizziness, seizures and headaches. Hematological: Does not bruise/bleed easily.    Psychiatric/Behavioral: Negative for hallucinations and

## 2020-01-31 ENCOUNTER — TELEPHONE (OUTPATIENT)
Dept: OTHER | Age: 50
End: 2020-01-31

## 2020-01-31 ENCOUNTER — TELEPHONE (OUTPATIENT)
Dept: PRIMARY CARE CLINIC | Age: 50
End: 2020-01-31

## 2020-01-31 NOTE — TELEPHONE ENCOUNTER
[facility] 41  TELEPHONE ENCOUNTER FORM    Jacob Islas 1970    ASSESSMENT:   1. Baseline weight: 174 lbs  2. Current weight: patient has not yet weighed himself  3. Patient weighing daily: No  4. Symptoms: denies symptoms  5. Patient knows who to call with symptoms: No  6. Diet history:      Patient states sodium limitation is : unsure      Patient states fluid limitation is unsure  Patient following diet as instructed: Yes  7. Medication history: taking medications as instructed Yes; medication side effects noted No  8. Patient is involved in exercise program: No  9. Patient knows date and time of follow up appointment: Yes  10. Patient has transportation to appointments: Yes    RECOMMENDATIONS:   1. Medication: picked up and is taking-- reviewed HF meds with patient. 2. Diet: trying to follow low sodium diet-- reviewed 3 G sodium limit and 64 oz fluid restriction  3. MD/ Clinic appointment: patient has follow up with [facility] on 2/19  4. Other:  Strongly encouraged daily weights. Was not discharged on diuretic. Reviewed importance of monitoring and calling his physicians with symptoms. Encouraged patient to call CHF Resource line with any questions or concerns.

## 2020-02-01 LAB
ALLERGEN BEEF: 0.14 KU/L
ALLERGEN PHOMA BETAE: 7.42 KU/L
ALLERGEN PORK: 0.12 KU/L
ALLERGEN SEE NOTE: ABNORMAL
ALLERGEN, FEATHER MIX: NEGATIVE KU/L
ASPERGILLUS FLAVUS ANTIBODIES: ABNORMAL
ASPERGILLUS FUMIGATUS #1: ABNORMAL
ASPERGILLUS FUMIGATUS #2: ABNORMAL
ASPERGILLUS FUMIGATUS #3: ABNORMAL
ASPERGILLUS FUMIGATUS #6: ABNORMAL
AUREOBASIDIUM PULLULANS: ABNORMAL
MICROPOLYSPORA FAENI: ABNORMAL
PIGEON SERUM ABS: ABNORMAL
SACCHAROMONOSPORA VIRIDIS: ABNORMAL
THERMOACTINOMYCES CANDIDUS AB: ABNORMAL
THERMOACTINOMYCES VULGARIS #1: ABNORMAL

## 2020-02-04 LAB
COCCIDIOIDES AG EIA: NORMAL
COCCIDIOIDES AG-SOURCE: NORMAL

## 2020-02-12 LAB — MISCELLANEOUS LAB TEST ORDER: NORMAL

## 2020-02-24 ENCOUNTER — OFFICE VISIT (OUTPATIENT)
Dept: PULMONOLOGY | Age: 50
End: 2020-02-24
Payer: MEDICAID

## 2020-02-24 VITALS
BODY MASS INDEX: 23.62 KG/M2 | DIASTOLIC BLOOD PRESSURE: 78 MMHG | WEIGHT: 165 LBS | OXYGEN SATURATION: 97 % | SYSTOLIC BLOOD PRESSURE: 102 MMHG | RESPIRATION RATE: 18 BRPM | HEIGHT: 70 IN | HEART RATE: 74 BPM

## 2020-02-24 PROCEDURE — 1036F TOBACCO NON-USER: CPT | Performed by: INTERNAL MEDICINE

## 2020-02-24 PROCEDURE — 1111F DSCHRG MED/CURRENT MED MERGE: CPT | Performed by: INTERNAL MEDICINE

## 2020-02-24 PROCEDURE — 99214 OFFICE O/P EST MOD 30 MIN: CPT | Performed by: INTERNAL MEDICINE

## 2020-02-24 PROCEDURE — G8420 CALC BMI NORM PARAMETERS: HCPCS | Performed by: INTERNAL MEDICINE

## 2020-02-24 PROCEDURE — G8484 FLU IMMUNIZE NO ADMIN: HCPCS | Performed by: INTERNAL MEDICINE

## 2020-02-24 PROCEDURE — G8428 CUR MEDS NOT DOCUMENT: HCPCS | Performed by: INTERNAL MEDICINE

## 2020-02-24 ASSESSMENT — ENCOUNTER SYMPTOMS
SHORTNESS OF BREATH: 1
APNEA: 0
CHOKING: 0
WHEEZING: 0
ABDOMINAL DISTENTION: 0
BLOOD IN STOOL: 0
RHINORRHEA: 0
VOICE CHANGE: 0
CONSTIPATION: 0
SINUS PRESSURE: 0
ABDOMINAL PAIN: 0
STRIDOR: 0
ANAL BLEEDING: 0
CHEST TIGHTNESS: 0
SORE THROAT: 0
BACK PAIN: 0
COUGH: 0
DIARRHEA: 0

## 2020-02-24 NOTE — PROGRESS NOTES
Anuel Aviles    YOB: 1970     Date of Service:  2/24/2020     Chief Complaint   Patient presents with    Pneumonia     HFU - pt seen in the hospital for Pneumonia / SOB         HPI patient has been accompanied by his mother to our office today. Had multiple hospitalizations so far for severe non-ischemic cardiomyopathy-latest admission at [facility] between 2/5 and 2/8 for volume overload and diuretic therapy. Patient states that he is now lost approximately 27 pounds in weight. No significant cough or phlegm. Dyspnea has improved. Allergies   Allergen Reactions    Vicodin [Hydrocodone-Acetaminophen] Hives     Outpatient Medications Marked as Taking for the 2/24/20 encounter (Office Visit) with Aurea Laura MD   Medication Sig Dispense Refill    mometasone-formoterol (Nellie Shell) 200-5 MCG/ACT inhaler Inhale 2 puffs into the lungs every 12 hours 1 Inhaler 3       Immunization History   Administered Date(s) Administered    Influenza 01/29/2013       Past Medical History:   Diagnosis Date    Anxiety     Asthma     Bipolar affective disorder ([facility] [address] 6/4/2010    Depression 6/4/2010    GERD (gastroesophageal reflux disease) 4/28/2010    Hypertension     Impotence 3/31/2010    Insomnia      Past Surgical History:   Procedure Laterality Date    BRONCHOSCOPY N/A 1/27/2020    BRONCHOSCOPY ALVEOLAR LAVAGE performed by Aurea Laura MD at [address] [place] [address]  1/27/2020    BRONCHOSCOPY BRUSHINGS performed by Aurea Laura MD at [address] [place] [address]     No family history on file. Review of Systems:  Review of Systems   Constitutional: Positive for fatigue. Negative for activity change, appetite change and fever. HENT: Negative for congestion, ear discharge, ear pain, postnasal drip, rhinorrhea, sinus pressure, sneezing, sore throat, tinnitus and voice change. Respiratory: Positive for shortness of breath.  Negative for apnea, cough, choking, chest tightness, wheezing and stridor. Cardiovascular: Negative for chest pain, palpitations and leg swelling. Gastrointestinal: Negative for abdominal distention, abdominal pain, anal bleeding, blood in stool, constipation and diarrhea. Musculoskeletal: Negative for arthralgias, back pain and gait problem. Skin: Negative for pallor and rash. Allergic/Immunologic: Negative for environmental allergies. Neurological: Negative for dizziness, tremors, seizures, syncope, speech difficulty, weakness, light-headedness, numbness and headaches. Hematological: Negative for adenopathy. Does not bruise/bleed easily. Psychiatric/Behavioral: Negative for sleep disturbance. Vitals:    02/24/20 1155   BP: 102/78   Pulse: 74   Resp: 18   SpO2: 97%   Weight: 165 lb (74.8 kg)   Height: 5' 10\" (1.778 m)     Body mass index is 23.68 kg/m². Wt Readings from Last 3 Encounters:   02/24/20 165 lb (74.8 kg)   01/30/20 174 lb (78.9 kg)   01/16/20 173 lb 8 oz (78.7 kg)     BP Readings from Last 3 Encounters:   02/24/20 102/78   01/30/20 102/68   01/27/20 107/69         Physical Exam  Constitutional:       General: He is not in acute distress. Appearance: He is well-developed. He is not diaphoretic. HENT:      Mouth/Throat:      Pharynx: No oropharyngeal exudate. Cardiovascular:      Rate and Rhythm: Normal rate and regular rhythm. Heart sounds: Normal heart sounds. No murmur. Pulmonary:      Effort: No respiratory distress. Breath sounds: Normal breath sounds. No wheezing or rales. Chest:      Chest wall: No tenderness. Abdominal:      General: There is no distension. Palpations: There is no mass. Tenderness: There is no abdominal tenderness. There is no guarding or rebound. Musculoskeletal:         General: No swelling, tenderness or deformity. Skin:     Coloration: Skin is not pale. Findings: No erythema or rash.    Neurological:      Mental Status: He is alert and oriented to person, place, and time. Cranial Nerves: No cranial nerve deficit. Motor: No abnormal muscle tone. Coordination: Coordination normal.      Deep Tendon Reflexes: Reflexes normal.             Health Maintenance   Topic Date Due    HIV screen  06/29/1986    Potassium monitoring  01/30/2021    Creatinine monitoring  01/30/2021    Shingles Vaccine (1 of 2) 06/29/2021    Lipid screen  01/28/2025    DTaP/Tdap/Td vaccine (2 - Td) 05/11/2025    Flu vaccine  Completed    Pneumococcal 0-64 years Vaccine  Completed    Hepatitis A vaccine  Aged Out    Hepatitis B vaccine  Aged Out    Hib vaccine  Aged Out    Meningococcal (ACWY) vaccine  Aged Out          Assessment/Plan:     Diagnosis Orders   1. Pleural effusion  CT CHEST WO CONTRAST   2. Pneumonia of both lungs due to infectious organism, unspecified part of lung  CT CHEST WO CONTRAST    3. Moderate persistent asthma, uncomplicated  Complex history of multifocal pneumonia and severe nonischemic cardiomyopathy with EF of 20%, normal coronary angiogram noted from 1/27. Also previously noted to have severe MR. History of sudden cardiac death in the family. Currently following up with cardiology at [facility].  He is supposed to be using LifeVest, which he is not fully compliant with. Explained the importance of this. Multifocal pneumonia/pleural effusion noted on prior CT imaging-Will request another CT scan to assess for clearance of the infiltrates. Prior bronchoscopy performed on 1/27 showed negative cultures. History of asthma-moderate persistent, good control. Continue with Dulera 200 and albuterol as needed. Return in about 2 months (around 4/24/2020).

## 2020-03-02 LAB
FUNGUS (MYCOLOGY) CULTURE: NORMAL
FUNGUS (MYCOLOGY) CULTURE: NORMAL
FUNGUS STAIN: NORMAL
FUNGUS STAIN: NORMAL

## 2020-03-03 ENCOUNTER — HOSPITAL ENCOUNTER (OUTPATIENT)
Dept: CT IMAGING | Age: 50
Discharge: HOME | End: 2020-03-03
Payer: MEDICAID

## 2020-03-03 PROCEDURE — 71250 CT THORAX DX C-: CPT

## 2020-03-17 LAB
AFB CULTURE (MYCOBACTERIA): NORMAL
AFB CULTURE (MYCOBACTERIA): NORMAL
AFB SMEAR: NORMAL
AFB SMEAR: NORMAL

## 2020-04-16 ENCOUNTER — TELEPHONE (OUTPATIENT)
Dept: CARDIOLOGY | Age: 50
End: 2020-04-16

## 2020-06-22 RX ORDER — MOMETASONE FUROATE AND FORMOTEROL FUMARATE DIHYDRATE 200; 5 UG/1; UG/1
AEROSOL RESPIRATORY (INHALATION)
Qty: 1 INHALER | Refills: 0 | Status: SHIPPED | OUTPATIENT
Start: 2020-06-22 | End: 2020-07-20

## 2020-07-20 RX ORDER — MOMETASONE FUROATE AND FORMOTEROL FUMARATE DIHYDRATE 200; 5 UG/1; UG/1
AEROSOL RESPIRATORY (INHALATION)
Qty: 1 INHALER | Refills: 0 | Status: SHIPPED | OUTPATIENT
Start: 2020-07-20 | End: 2021-01-11

## 2020-07-28 ENCOUNTER — VIRTUAL VISIT (OUTPATIENT)
Dept: PULMONOLOGY | Age: 50
End: 2020-07-28
Payer: MEDICAID

## 2020-07-28 PROCEDURE — 99213 OFFICE O/P EST LOW 20 MIN: CPT | Performed by: INTERNAL MEDICINE

## 2020-07-28 ASSESSMENT — ENCOUNTER SYMPTOMS
BACK PAIN: 0
ABDOMINAL PAIN: 0
SORE THROAT: 0
ABDOMINAL DISTENTION: 0
CHOKING: 0
SHORTNESS OF BREATH: 1
VOICE CHANGE: 0
SINUS PRESSURE: 0
COUGH: 0
WHEEZING: 0
DIARRHEA: 0
RHINORRHEA: 0
APNEA: 0
CONSTIPATION: 0
ANAL BLEEDING: 0
STRIDOR: 0
CHEST TIGHTNESS: 0
BLOOD IN STOOL: 0

## 2020-07-28 NOTE — PROGRESS NOTES
[facility]     Pulmonology Telephone Visit    Pursuant to the emergency declaration under the [facility] waiver authority and the [employer] General Act this Telephone Visit was insisted, with patient's consent, to reduce the patient's risk of exposure to COVID-19 and provide continuity of care for an established patient. The patient was at home, while the provider was at the clinic. Services were provided through a synchronous discussion through a Telephone Call to substitute for in-person clinic visit, and coded as such. Total time spent with patient was 12 minutes. YOB: 1970     Date of Service:  7/28/2020     Chief Complaint   Patient presents with    Other     pleural effusion         HPI doxy visit was converted into a telephone visit, due to connectivity issues. Patient states that he has been doing quite well. Currently being followed up with [facility] for history of nonischemic cardiomyopathy. Still sometimes feels winded, but he attributes this to his heart disease rather than asthma. States that he has not been using Dulera, since he has not been wheezing. Uses albuterol inhaler once or twice a day as needed. Has been watching his weight and fluid intake closely.     Allergies   Allergen Reactions    Vicodin [Hydrocodone-Acetaminophen] Hives     Outpatient Medications Marked as Taking for the 7/28/20 encounter (Virtual Visit) with Sascha Virk MD   Medication Sig Dispense Refill    DULERA 200-5 MCG/ACT inhaler INHALE TWO PUFFS BY MOUTH EVERY 12 HOURS 1 Inhaler 0    lisinopril (PRINIVIL;ZESTRIL) 5 MG tablet Take 1 tablet by mouth daily 30 tablet 1    metoprolol succinate (TOPROL XL) 50 MG extended release tablet Take 1 tablet by mouth nightly 30 tablet 1    albuterol sulfate  (90 Base) MCG/ACT inhaler Inhale 2 puffs into the lungs 4 times daily as needed for Wheezing 1 Inhaler 0    guaiFENesin (ALTARUSSIN) 100 MG/5ML syrup Take 10 mLs by mouth every 4 hours as needed for Cough 110 mL 0    omeprazole (PRILOSEC) 40 MG delayed release capsule Take 40 mg by mouth daily       ALPRAZolam (XANAX) 0.5 MG tablet Take 0.5 mg by mouth 2 times daily as needed.  sertraline (ZOLOFT) 50 MG tablet Take 50 mg by mouth nightly as needed         Immunization History   Administered Date(s) Administered    Influenza 01/29/2013       Past Medical History:   Diagnosis Date    Anxiety     Asthma     Bipolar affective disorder ([facility] [address] 6/4/2010    Depression 6/4/2010    GERD (gastroesophageal reflux disease) 4/28/2010    Hypertension     Impotence 3/31/2010    Insomnia      Past Surgical History:   Procedure Laterality Date    BRONCHOSCOPY N/A 1/27/2020    BRONCHOSCOPY ALVEOLAR LAVAGE performed by Jim Swann MD at [facility]  1/27/2020    BRONCHOSCOPY BRUSHINGS performed by Jim Swann MD at [address] [place] [address]     History reviewed. No pertinent family history. Review of Systems:  Review of Systems   Constitutional: Positive for fatigue. Negative for activity change, appetite change and fever. HENT: Negative for congestion, ear discharge, ear pain, postnasal drip, rhinorrhea, sinus pressure, sneezing, sore throat, tinnitus and voice change. Respiratory: Positive for shortness of breath. Negative for apnea, cough, choking, chest tightness, wheezing and stridor. Cardiovascular: Negative for chest pain, palpitations and leg swelling. Gastrointestinal: Negative for abdominal distention, abdominal pain, anal bleeding, blood in stool, constipation and diarrhea. Musculoskeletal: Positive for arthralgias. Negative for back pain and gait problem. Skin: Negative for pallor and rash. Allergic/Immunologic: Negative for environmental allergies.    Neurological: Negative for dizziness, tremors, seizures, syncope, speech difficulty, weakness, light-headedness, numbness and headaches. Hematological: Negative for adenopathy. Does not bruise/bleed easily. Psychiatric/Behavioral: Negative for sleep disturbance. There were no vitals filed for this visit. Patient-Reported Vitals 7/28/2020   Patient-Reported Weight 165lb   Patient-Reported Height [id number]   Patient-Reported Systolic 814   Patient-Reported Diastolic 71      There is no height or weight on file to calculate BMI. Wt Readings from Last 3 Encounters:   02/24/20 165 lb (74.8 kg)   01/30/20 174 lb (78.9 kg)   01/16/20 173 lb 8 oz (78.7 kg)     BP Readings from Last 3 Encounters:   02/24/20 102/78   01/30/20 102/68   01/27/20 107/69       Physical examination:    Due to the current efforts to prevent transmission of COVID-19 and also the need to preserve PPE for other caregivers, a face-to-face encounter with the patient was not performed. That being said, all relevant records and diagnostic tests were reviewed, including laboratory results and imaging. Please reference any relevant documentation elsewhere. Care will be coordinated with the primary service. Health Maintenance   Topic Date Due    HIV screen  06/29/1986    Flu vaccine (1) 09/01/2020    Potassium monitoring  01/30/2021    Creatinine monitoring  01/30/2021    Lipid screen  01/28/2025    DTaP/Tdap/Td vaccine (2 - Td) 05/11/2025    Pneumococcal 0-64 years Vaccine  Completed    Hepatitis A vaccine  Aged Out    Hepatitis B vaccine  Aged Out    Hib vaccine  Aged Out    Meningococcal (ACWY) vaccine  Aged Out          Assessment/Plan:    Nonischemic cardiomyopathy. Follows up with cardiology with [facility]. MRI scan performed on 7/8 shows low EF of 48%, reduced RVEF of 23% and mitral regurgitation. Patient is currently on metoprolol, Entresto and Farxiga/SGLT2 inhibitor.     Patient's asthma symptoms are under good control, agree with discontinuing Dulera and only using albuterol as needed. Prior CT chest performed in March shows near complete recovery of bilateral infiltrates/effusions. Patient will be discharged from our office. Asked patient to contact us if needed.

## 2021-01-09 DIAGNOSIS — J45.909 ASTHMA, UNSPECIFIED ASTHMA SEVERITY, UNSPECIFIED WHETHER COMPLICATED, UNSPECIFIED WHETHER PERSISTENT: Primary | ICD-10-CM

## 2021-03-16 DIAGNOSIS — J45.909 ASTHMA, UNSPECIFIED ASTHMA SEVERITY, UNSPECIFIED WHETHER COMPLICATED, UNSPECIFIED WHETHER PERSISTENT: ICD-10-CM

## 2021-03-16 RX ORDER — MOMETASONE FUROATE AND FORMOTEROL FUMARATE DIHYDRATE 200; 5 UG/1; UG/1
AEROSOL RESPIRATORY (INHALATION)
Qty: 13 G | Refills: 0 | Status: SHIPPED | OUTPATIENT
Start: 2021-03-16 | End: 2021-04-19

## 2021-04-17 DIAGNOSIS — J45.909 ASTHMA, UNSPECIFIED ASTHMA SEVERITY, UNSPECIFIED WHETHER COMPLICATED, UNSPECIFIED WHETHER PERSISTENT: ICD-10-CM

## 2021-04-19 RX ORDER — MOMETASONE FUROATE AND FORMOTEROL FUMARATE DIHYDRATE 200; 5 UG/1; UG/1
AEROSOL RESPIRATORY (INHALATION)
Qty: 13 G | Refills: 0 | Status: SHIPPED | OUTPATIENT
Start: 2021-04-19

## 2021-08-19 NOTE — ED PROVIDER NOTES
[hydrocodone-acetaminophen]    FAMILYHISTORY     History reviewed. No pertinent family history. SOCIAL HISTORY       Social History     Tobacco Use    Smoking status: Current Some Day Smoker    Smokeless tobacco: Never Used    Tobacco comment: socially   Substance Use Topics    Alcohol use: Yes     Comment: socially    Drug use: No       SCREENINGS             PHYSICAL EXAM    (up to 7 for level 4, 8 or more for level 5)     ED Triage Vitals [01/24/20 1144]   BP Temp Temp Source Pulse Resp SpO2 Height Weight   111/69 98.2 °F (36.8 °C) Infrared 125 24 96 % 5' 10\" (1.778 m) 183 lb 6 oz (83.2 kg)       Physical Exam  Vitals signs and nursing note reviewed. Constitutional:       Appearance: Normal appearance. He is well-developed. He is not toxic-appearing or diaphoretic. HENT:      Head: Normocephalic and atraumatic. Right Ear: External ear normal.      Left Ear: External ear normal.      Nose: Congestion present. Mouth/Throat:      Mouth: Mucous membranes are moist.      Pharynx: Oropharynx is clear. Eyes:      General: No scleral icterus. Right eye: No discharge. Left eye: No discharge. Extraocular Movements: Extraocular movements intact. Conjunctiva/sclera: Conjunctivae normal.      Pupils: Pupils are equal, round, and reactive to light. Neck:      Musculoskeletal: Normal range of motion. Cardiovascular:      Rate and Rhythm: Tachycardia present. Pulmonary:      Effort: Tachypnea present. Breath sounds: Wheezing present. Abdominal:      General: Bowel sounds are normal.      Palpations: Abdomen is soft. Tenderness: There is no tenderness. Musculoskeletal: Normal range of motion. Comments: Trace symmetrical lower leg edema without posterior calf or thigh tenderness, palpable cord, discoloration. Negative homans. Skin:     General: Skin is warm and dry. Capillary Refill: Capillary refill takes less than 2 seconds.       Coloration: Skin is not jaundiced or pale. Findings: No bruising, erythema, lesion or rash. Neurological:      General: No focal deficit present. Mental Status: He is alert and oriented to person, place, and time.    Psychiatric:         Mood and Affect: Mood normal.         Behavior: Behavior normal.         DIAGNOSTIC RESULTS   LABS:    Labs Reviewed   CBC WITH AUTO DIFFERENTIAL - Abnormal; Notable for the following components:       Result Value    WBC 14.6 (*)     RBC 3.55 (*)     Hemoglobin 11.2 (*)     Hematocrit 35.2 (*)     Neutrophils Absolute 12.0 (*)     All other components within normal limits    Narrative:     Performed at:  [facility] [address], [place], [address]   Phone [phone]   COMPREHENSIVE METABOLIC PANEL - Abnormal; Notable for the following components:    Sodium 135 (*)     CREATININE 0.6 (*)     Total Protein 5.9 (*)     Alb 2.8 (*)     Albumin/Globulin Ratio 0.9 (*)     ALT 51 (*)     All other components within normal limits    Narrative:     Performed at:  [facility] [address], [place], [address]   Phone [phone]   PROTIME-INR - Abnormal; Notable for the following components:    Protime 13.5 (*)     INR 1.16 (*)     All other components within normal limits    Narrative:     Performed at:  [facility] [address], [place], [facility] [address] [place]   Phone 21  - Abnormal; Notable for the following components:    Pro-BNP 3,189 (*)     All other components within normal limits    Narrative:     Performed at:  [facility] [address], [place], [address]   Phone [phone]   RAPID INFLUENZA A/B ANTIGENS    Narrative:     Performed at:  [facility] [address],  [place], [facility] [address] [place]   Phone [phone]   CULTURE BLOOD #2   CULTURE BLOOD #1   LACTIC ACID, Reason for exam:->recent pna. cough/sob Reason for Exam: Cough (Pt. comes in today with complaints of a cough. Pt. reports that he was just recently released from [facility] and being treated for pneumonia. Pt. states that he is not feeling any better. ) Acuity: Acute Type of Exam: Initial FINDINGS: Scattered airspace opacities are demonstrated bilaterally that is worse on the right compared to the left. Overall this is increased compared to prior examination. There is no effusion. There is no pneumothorax. The heart is enlarged. The upper abdomen is unremarkable. The extrathoracic soft tissues are unremarkable. Scattered airspace opacities concerning for pneumonia that is worse compared to prior examination. PROCEDURES   Unless otherwise noted below, none     Procedures    CRITICAL CARE TIME   Critical Care  There was a high probability of life-threatening clinical deterioration in the patient's condition requiring my urgent intervention. Total critical care time with the patient was 42 minutes excluding separately reportable procedures. Critical care required due to patients HCAP with sepsis prompting medical management, consultations, consideration of treatment, admission. SEP-1 CORE MEASURE DATA    Classification: sepsis    Amount of fluids ordered: patient declines IV fluids because he was given IV fluids during his most recent admission which he believes has caused leg edema and discomfort. Time at which sepsis was identified: 1230    Broad-spectrum antibiotics chosen: cefepime and vancomycin based on sepsis order-set for a suspected source of: Pulmonary - Nosocomial    Repeat lactate level: not indicated              CONSULTS:  Vickie, pharmacist, has helped to adjust the dose of vancomycin to appropriate dose for this patient.    IP CONSULT TO HOSPITALIST  Dr Florin Schmitt will admit (1400)    EMERGENCY DEPARTMENT COURSE and DIFFERENTIAL DIAGNOSIS/MDM:   Vitals:    Vitals: Propranolol Pregnancy And Lactation Text: This medication is Pregnancy Category C and it isn't known if it is safe during pregnancy. It is excreted in breast milk.

## (undated) DEVICE — SINGLE USE SUCTION VALVE MAJ-209: Brand: SINGLE USE SUCTION VALVE (STERILE)

## (undated) DEVICE — DISPOSABLE CYTOLOGY BRUSH: Brand: DISPOSABLE CYTOLOGY BRUSH

## (undated) DEVICE — CONMED DISPOSABLE MICROBIOLOGY BRUSH, Ø1 MM, 1.8 MM WORKING DIAMETER, 110 CM LENGTH: Brand: CONMED

## (undated) DEVICE — AIRWAY PHGEAL AD 100MM PNK M WLLMS

## (undated) DEVICE — ADAPTER TBNG DIA15MM SWVL FBROPT BRONCHSCP TERM 2 AXIS PEEP

## (undated) DEVICE — 60 ML SYRINGE,REGULAR TIP: Brand: MONOJECT

## (undated) DEVICE — PROCEDURE KIT ENDOSCP CUST

## (undated) DEVICE — SYRINGE MED 10ML POLYPR LUERSLIP TIP FLAT TOP W/O SFTY DISP

## (undated) DEVICE — SINGLE USE BIOPSY VALVE MAJ-210: Brand: SINGLE USE BIOPSY VALVE (STERILE)

## (undated) DEVICE — SPECIMEN TRAP: Brand: ARGYLE

## (undated) DEVICE — GOWN AURORA NONREINF LG: Brand: MEDLINE INDUSTRIES, INC.

## (undated) DEVICE — Device

## (undated) DEVICE — CONMED CHANNEL MASTER PULMONARY AND PEDIATRIC CLEANING BRUSH, 160 CM X 2.0 MM: Brand: CONMED